# Patient Record
Sex: MALE | Race: WHITE | Employment: OTHER | URBAN - METROPOLITAN AREA
[De-identification: names, ages, dates, MRNs, and addresses within clinical notes are randomized per-mention and may not be internally consistent; named-entity substitution may affect disease eponyms.]

---

## 2017-02-08 ENCOUNTER — OFFICE VISIT (OUTPATIENT)
Dept: FAMILY MEDICINE | Facility: CLINIC | Age: 67
End: 2017-02-08
Payer: COMMERCIAL

## 2017-02-08 ENCOUNTER — RADIANT APPOINTMENT (OUTPATIENT)
Dept: GENERAL RADIOLOGY | Facility: CLINIC | Age: 67
End: 2017-02-08
Attending: FAMILY MEDICINE
Payer: COMMERCIAL

## 2017-02-08 ENCOUNTER — TELEPHONE (OUTPATIENT)
Dept: FAMILY MEDICINE | Facility: CLINIC | Age: 67
End: 2017-02-08

## 2017-02-08 VITALS
TEMPERATURE: 97.7 F | WEIGHT: 221 LBS | DIASTOLIC BLOOD PRESSURE: 87 MMHG | SYSTOLIC BLOOD PRESSURE: 170 MMHG | HEART RATE: 74 BPM | RESPIRATION RATE: 14 BRPM | OXYGEN SATURATION: 97 % | BODY MASS INDEX: 32.18 KG/M2

## 2017-02-08 DIAGNOSIS — E78.5 HYPERLIPIDEMIA LDL GOAL <100: ICD-10-CM

## 2017-02-08 DIAGNOSIS — I25.10 CORONARY ARTERY DISEASE INVOLVING NATIVE CORONARY ARTERY OF NATIVE HEART WITHOUT ANGINA PECTORIS: Primary | ICD-10-CM

## 2017-02-08 DIAGNOSIS — I10 HYPERTENSION GOAL BP (BLOOD PRESSURE) < 140/90: ICD-10-CM
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
## 2017-02-08 DIAGNOSIS — R07.81 RIB PAIN ON LEFT SIDE: ICD-10-CM

## 2017-02-08 DIAGNOSIS — J18.9 PNEUMONIA OF LEFT LOWER LOBE DUE TO INFECTIOUS ORGANISM: Primary | ICD-10-CM

## 2017-02-08 PROCEDURE — 71101 X-RAY EXAM UNILAT RIBS/CHEST: CPT | Mod: LT

## 2017-02-08 PROCEDURE — 99214 OFFICE O/P EST MOD 30 MIN: CPT | Performed by: FAMILY MEDICINE

## 2017-02-08 RX ORDER — LEVOFLOXACIN 500 MG/1
500 TABLET, FILM COATED ORAL DAILY
Qty: 7 TABLET | Refills: 0 | Status: SHIPPED | OUTPATIENT
Start: 2017-02-08 | End: 2017-02-22

## 2017-02-08 RX ORDER — HYDROCODONE BITARTRATE AND ACETAMINOPHEN 5; 325 MG/1; MG/1
1 TABLET ORAL EVERY 6 HOURS PRN
Qty: 30 TABLET | Refills: 0 | Status: SHIPPED | OUTPATIENT
Start: 2017-02-08 | End: 2017-02-09

## 2017-02-08 RX ORDER — SIMVASTATIN 20 MG
20 TABLET ORAL AT BEDTIME
Qty: 30 TABLET | Refills: 0 | Status: SHIPPED | OUTPATIENT
Start: 2017-02-08 | End: 2017-02-22

## 2017-02-08 RX ORDER — AMLODIPINE BESYLATE 2.5 MG/1
2.5 TABLET ORAL DAILY
Qty: 30 TABLET | Refills: 0 | Status: SHIPPED | OUTPATIENT
Start: 2017-02-08 | End: 2017-02-22

## 2017-02-08 RX ORDER — CYCLOBENZAPRINE HCL 5 MG
TABLET ORAL
Qty: 90 TABLET | Refills: 0 | Status: SHIPPED | OUTPATIENT
Start: 2017-02-08 | End: 2017-02-22

## 2017-02-08 ASSESSMENT — PAIN SCALES - GENERAL: PAINLEVEL: MODERATE PAIN (4)

## 2017-02-08 NOTE — PATIENT INSTRUCTIONS
Rib Contusion or Minor Fracture    A rib contusion is a bruise to one or more rib bones. It may cause pain, tenderness, swelling, and a purplish tint to the skin. There may be a sharp pain with each breath. A rib contusion takes anywhere from a few days to a few weeks to heal. A minor rib fracture or break may cause the same symptoms as a rib contusion. The small crack may not be seen on a regular chest X-ray. Treatment for both problems is the same.  Home care    You may use over-the-counter pain medicine to control pain, unless another pain medicine was prescribed. If you have chronic liver or kidney disease or ever had a stomach ulcer or GI bleeding, talk with your healthcare provider before using these medicines.    Rest. Do not lift anything heavy or do any activity that causes pain.    Apply an ice pack over the injured area for 15 to 20 minutes every 1 to 2 hours. You should do this for the first 24 to 48 hours. You can make an ice pack by filling a plastic bag that seals at the top with ice cubes and then wrapping it with a thin towel. Continue with ice packs as needed for the relief of pain and swelling.    The first 3 to 4 weeks of healing will be the most painful. If your pain is not under control with the treatment given, call your healthcare provider. Sometimes a stronger pain medicine may be needed. A nerve block can be done in case of severe pain. It will numb the nerve between the ribs.  Follow-up care  Follow up with your healthcare provider, or as advised.  If X-rays were taken, you will be told of any new findings that may affect your care.  Call 911   Call 911 if you have:    Dizziness, weakness or fainting    Shortness of breath with or without chest discomfort    New or worsening pain  When to seek medical advice  Call your healthcare provider right away if any of these occur:    Fever of 100.4 F (38 C) or above lasting for 24 to 48 hours    Stomach pain    2803-9396 [employer] [address] [place] [address]. All rights reserved. This information is not intended as a substitute for professional medical care. Always follow your healthcare professional's instructions.

## 2017-02-08 NOTE — MR AVS SNAPSHOT
After Visit Summary   2/8/2017    Jcarlos Alvarez    MRN: [id number]           Patient Information     Date Of Birth          1950        Visit Information        Provider Department      2/8/2017 1:20 PM Marta Hagen MD [facility]        Today's Diagnoses     Coronary artery disease involving native coronary artery of native heart without angina pectoris    -  1     Hyperlipidemia LDL goal <100         Hypertension goal BP (blood pressure) < 140/90         Rib pain on left side         Fall due to ice or snow, initial encounter           Care Instructions      Rib Contusion or Minor Fracture    A rib contusion is a bruise to one or more rib bones. It may cause pain, tenderness, swelling, and a purplish tint to the skin. There may be a sharp pain with each breath. A rib contusion takes anywhere from a few days to a few weeks to heal. A minor rib fracture or break may cause the same symptoms as a rib contusion. The small crack may not be seen on a regular chest X-ray. Treatment for both problems is the same.  Home care    You may use over-the-counter pain medicine to control pain, unless another pain medicine was prescribed. If you have chronic liver or kidney disease or ever had a stomach ulcer or GI bleeding, talk with your healthcare provider before using these medicines.    Rest. Do not lift anything heavy or do any activity that causes pain.    Apply an ice pack over the injured area for 15 to 20 minutes every 1 to 2 hours. You should do this for the first 24 to 48 hours. You can make an ice pack by filling a plastic bag that seals at the top with ice cubes and then wrapping it with a thin towel. Continue with ice packs as needed for the relief of pain and swelling.    The first 3 to 4 weeks of healing will be the most painful. If your pain is not under control with the treatment given, call your healthcare provider. Sometimes a stronger pain medicine may be needed.  A nerve block can be done in case of severe pain. It will numb the nerve between the ribs.  Follow-up care  Follow up with your healthcare provider, or as advised.  If X-rays were taken, you will be told of any new findings that may affect your care.  Call 911   Call 911 if you have:    Dizziness, weakness or fainting    Shortness of breath with or without chest discomfort    New or worsening pain  When to seek medical advice  Call your healthcare provider right away if any of these occur:    Fever of 100.4 F (38 C) or above lasting for 24 to 48 hours    Stomach pain    5814-4655 [employer]. [address] [place] [address] 71242. All rights reserved. This information is not intended as a substitute for professional medical care. Always follow your healthcare professional's instructions.              Follow-ups after your visit        Who to contact     If you have questions or need follow up information about today's clinic visit or your schedule please contact [facility] directly at [phone].  Normal or non-critical lab and imaging results will be communicated to you by [employer], letter or phone within 4 business days after the clinic has received the results. If you do not hear from us within 7 days, please contact the clinic through [employer] or phone. If you have a critical or abnormal lab result, we will notify you by phone as soon as possible.  Submit refill requests through [employer] or call your pharmacy and they will forward the refill request to us. Please allow 3 business days for your refill to be completed.          Additional Information About Your Visit        [employer] Information     [employer] gives you secure access to your electronic health record. If you see a primary care provider, you can also send messages to your care team and make appointments. If you have questions, please call your primary care clinic.  If you do not have a primary care provider, please call  [phone] and they will assist you.        Care EveryWhere ID     This is your Care EveryWhere ID. This could be used by other organizations to access your [facility] medical records  [id number]        Your Vitals Were     Pulse Temperature Respirations Pulse Oximetry          74 97.7  F (36.5  C) (Oral) 14 97%         Blood Pressure from Last 3 Encounters:   02/08/17 170/87   07/17/14 138/80   12/04/13 131/69    Weight from Last 3 Encounters:   02/08/17 221 lb (100.245 kg)   07/17/14 197 lb (89.359 kg)   12/04/13 210 lb (95.255 kg)              We Performed the Following     XR Ribs & Chest Lt 3v          Today's Medication Changes          These changes are accurate as of: 2/8/17  2:29 PM.  If you have any questions, ask your nurse or doctor.               Start taking these medicines.        Dose/Directions    amLODIPine 2.5 MG tablet   Commonly known as:  NORVASC   Used for:  Hypertension goal BP (blood pressure) < 140/90   Started by:  Marta Hagen MD        Dose:  2.5 mg   Take 1 tablet (2.5 mg) by mouth daily   Quantity:  30 tablet   Refills:  0       HYDROcodone-acetaminophen 5-325 MG per tablet   Commonly known as:  NORCO   Used for:  Rib pain on left side, Fall due to ice or snow, initial encounter   Started by:  Marta Hagen MD        Dose:  1 tablet   Take 1 tablet by mouth every 6 hours as needed for moderate to severe pain or pain   Quantity:  30 tablet   Refills:  0            Where to get your medicines      These medications were sent to [id number] - [facility] [address] [place] [address] AT [facility]  [address] [facility] [address]     Phone:  [phone]    - amLODIPine 2.5 MG tablet      Some of these will need a paper prescription and others can be bought over the counter.  Ask your nurse if you have questions.     Bring a paper prescription for each of these medications    - HYDROcodone-acetaminophen 5-325 MG per  tablet             Primary Care Provider Office Phone # Fax #    Feuntes Sims -[phone]       [facility] [address] [facility] [address]        Thank you!     Thank you for choosing [facility]  for your care. Our goal is always to provide you with excellent care. Hearing back from our patients is one way we can continue to improve our services. Please take a few minutes to complete the written survey that you may receive in the mail after your visit with us. Thank you!             Your Updated Medication List - Protect others around you: Learn how to safely use, store and throw away your medicines at www.disposemymeds.org.          This list is accurate as of: 2/8/17  2:29 PM.  Always use your most recent med list.                   Brand Name Dispense Instructions for use    amLODIPine 2.5 MG tablet    NORVASC    30 tablet    Take 1 tablet (2.5 mg) by mouth daily       aspirin 81 MG tablet      Take 1 tablet by mouth daily.       HYDROcodone-acetaminophen 5-325 MG per tablet    NORCO    30 tablet    Take 1 tablet by mouth every 6 hours as needed for moderate to severe pain or pain       lisinopril 10 MG tablet    PRINIVIL/ZESTRIL    90 tablet    Take 1 tablet (10 mg) by mouth daily       Multi-vitamin Tabs tablet   Generic drug:  multivitamin, therapeutic with minerals      1 TABLET DAILY       order for DME     1 Device    Please dispense one pair of gel inserts for heel       simvastatin 20 MG tablet    ZOCOR     Take  by mouth At Bedtime.

## 2017-02-08 NOTE — NURSING NOTE
"Chief Complaint   Patient presents with     Fall     pt fell 2/7/17 c/o left side and back pain       Initial /87 mmHg  Pulse 74  Temp(Src) 97.7  F (36.5  C) (Oral)  Resp 14  Wt 221 lb (100.245 kg)  SpO2 97% Estimated body mass index is 32.18 kg/(m^2) as calculated from the following:    Height as of 7/17/14: 5' 9.5\" (1.765 m).    Weight as of this encounter: 221 lb (100.245 kg).  Medication Reconciliation: complete   Daquan Kunz MA      "

## 2017-02-08 NOTE — TELEPHONE ENCOUNTER
Called jasson. Discussed xray report and mychart message over the phone (see mychart message)  Prescribed with levaquin to treat pneumonia seen on chest xray . Side effects discussed. Aware of the risks of increased tendon rupture with fluoroquinolones, especially if used with steroids.  No signs or symptoms of pneumonia, just left rib pain but could've been a complication of rib fracture or atelectasis.  Recommend repeat chest xray in 6 weeks to make sure resolved. Order in, patient will schedule appointment  If persist then, recommend ct chest  Patient voiced understanding. Come in asap if worsening symptoms or if no relief  If with any symptoms of chest pain or shortness of breath, lightheadedness, palpitations, feeling like passing out or change and worsening in the quality of your symptoms, please proceed to ER. Recommend follow up with PCP in a few days for re-evaluation.   Offered ct evaluation, patient declined. Prefers empiric treatment .   Marta Hagen M.D.

## 2017-02-08 NOTE — PROGRESS NOTES
SUBJECTIVE:                                                    Jcarlos Alvarez is a 66 year old male who presents to clinic today for the following health issues:      Musculoskeletal problem/pain      Duration: 2/7/17    Description  Location: left side and back    Intensity:  moderate    Accompanying signs and symptoms: none    History  Previous similar problem: no   Previous evaluation:  none    Precipitating or alleviating factors:  Trauma or overuse: YES  Aggravating factors include: overuse    Therapies tried and outcome: ice       Patient CAD native heart. Brother had a heart attack saw cardiology based on risk recommended simvastatin low dose  Patient used to be on lisinopril for hypertension but has run out past 2 months. Would like to try something else because of cough. Possibly from lisinopril but.= Patient not absolutely sure.    Yesterday had a fall slipped on the driveway landed on the left side on the left rib   head injury:No  loss of consciousness:  No  syncope or presyncope: No  chest pain or palpitation: No  mechanical fall:  Yes  using assistive devices:  No  blood thinners: No except for baby aspirin.   Pregnant: N/A    ROS:  as per hpi  denies headache  denies any nausea or vomiting  denies any amnesia, confusion or concussion symptoms  denies any blurring of vision  denies any otorrhea or rhinorhea  denies any neck pain  denies any back pain.  denies any chest pain or shortness of breath  denies any joint pain except noted above.  denies any bowel or bladder incontinence or motor or sensory deficits.  denies any abdominal pain, nausea or vomiting or flank pain  denies any hematuria      Allergies   Allergen Reactions     Nkda [No Known Drug Allergies]        Past Medical History   Diagnosis Date     Cervicalgia      Old bucket handle tear of medial meniscus      right     Other affections of shoulder region, not elsewhere classified      Diverticula of colon 2009     Diverticulosis sigmoid  colon.         Current Outpatient Prescriptions on File Prior to Visit:  simvastatin (ZOCOR) 20 MG tablet Take  by mouth At Bedtime.   ORDER FOR DME Please dispense one pair of gel inserts for heel   MULTI-VITAMIN OR TABS 1 TABLET DAILY   aspirin 81 MG tablet Take 1 tablet by mouth daily.   lisinopril (PRINIVIL,ZESTRIL) 10 MG tablet Take 1 tablet (10 mg) by mouth daily     No current facility-administered medications on file prior to visit.    Social History   Substance Use Topics     Smoking status: Never Smoker      Smokeless tobacco: Never Used     Alcohol Use: Yes      Comment: Occassionally       ROS:  10 point review of systems negative except for noted above.   No thoughts of harming self or others.     OBJECTIVE:  /87 mmHg  Pulse 74  Temp(Src) 97.7  F (36.5  C) (Oral)  Resp 14  Wt 221 lb (100.245 kg)  SpO2 97%   General:   awake, alert, and cooperative.  NAD.   Head: Normocephalic, atraumatic.  Eyes: Conjunctiva clear,   ENT: no periorbital ecchymosis, no otorrhea or rhinorrhea, negative Conner's sign, no raccoon eyes, no hematympanum  Heart: Regular rate and rhythm. No murmur.  Lungs: Chest is clear; no wheezes or rales.   Abdomen: soft non-tender. No bruising noted.   Neuro: Alert and oriented - normal speech. Cranial nerves intact, MMT 5/5 all extremities, sensory intact, normal gait and normal cerebellar function  MS: Using extremities freely positive left lateral rib pain reproducible with palpation and increased pain with range of motion , No cervical, thoracic, or lumbar spine tenderness  PSYCH:  Normal affect, normal speech  SKIN: no obvious rashes    Diagnostic Test Results:    Xray to my initial read negative but see telephone note encounter no rib fracture but possible pneumonia seen      ASSESSMENT      ICD-10-CM    1. Coronary artery disease involving native coronary artery of native heart without angina pectoris I25.10    2. Hyperlipidemia LDL goal <100 E78.5    3. Hypertension goal  BP (blood pressure) < 140/90 I10 amLODIPine (NORVASC) 2.5 MG tablet   4. Rib pain on left side R07.81 XR Ribs & Chest Lt 3v     HYDROcodone-acetaminophen (NORCO) 5-325 MG per tablet     cyclobenzaprine (FLEXERIL) 5 MG tablet   5. Fall due to ice or snow, initial encounter W00.9XXA HYDROcodone-acetaminophen (NORCO) 5-325 MG per tablet         PLAN:   Fall sustaining rib pain or injury. See patient instructions  Pain control : prescribed with vicodin as needed moderate to severe pain. Also prescribed with flexeril  Warned sedating try not to take together  Sedating medications given. Aware not to drive or operate machinery while on these medications. Caution with .     BP elevated but patient not taking anything requesting switch different bp med. Trial amlodipine. Side effects discussed  Follow up with primary care provider within one to two weeks  If with any symptoms of chest pain or shortness of breath, lightheadedness, palpitations, feeling like passing out or change and worsening in the quality of your symptoms, please proceed to ER. Recommend follow up with PCP in a few days for re-evaluation.     Refilled simvastatin as well for one month.     Aware to go to the ER if with worsening symptoms of headache, nausea or vomiting, chest pain or shortness of breath, bowel/bladder incontinence, motor or sensory deficits, bloody urine, changes in behavior, confusion, difficulty walking or seizure    ADDENDUM: telephone note  Called jasson. Discussed xray report and mychart message over the phone (see mychart message)  Prescribed with levaquin to treat pneumonia seen on chest xray . Side effects discussed. Aware of the risks of increased tendon rupture with fluoroquinolones, especially if used with steroids.  No signs or symptoms of pneumonia, just left rib pain but could've been a complication of rib fracture or atelectasis.  Recommend repeat chest xray in 6 weeks to make sure resolved. Order in, patient  will schedule appointment  If persist then, recommend ct chest  Patient voiced understanding. Come in asap if worsening symptoms or if no relief  If with any symptoms of chest pain or shortness of breath, lightheadedness, palpitations, feeling like passing out or change and worsening in the quality of your symptoms, please proceed to ER. Recommend follow up with PCP in a few days for re-evaluation.   Offered ct evaluation, patient declined. Prefers empiric treatment .    Marta Hagen M.D.     Follow up:  With primary care provider in 3 days, sooner if worse   Advised about symptoms which might herald more serious problems.        Marta Hagen MD

## 2017-02-08 NOTE — TELEPHONE ENCOUNTER
"Per patient, fell of curb yesterday and fell onto back, hurt ribs 6\" below scapula.  Denies shortness of breath, dyspnea on exertion, chest pain.   An appointment is schedule for today due to pain.   The patient/parent agrees with the plan and verbalized good understanding.  Angelica Maurer RN     "

## 2017-02-09 ENCOUNTER — MYC MEDICAL ADVICE (OUTPATIENT)
Dept: FAMILY MEDICINE | Facility: CLINIC | Age: 67
End: 2017-02-09

## 2017-02-09 DIAGNOSIS — R07.81 RIB PAIN ON LEFT SIDE: Primary | ICD-10-CM
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
## 2017-02-09 RX ORDER — HYDROCODONE BITARTRATE AND ACETAMINOPHEN 5; 325 MG/1; MG/1
2 TABLET ORAL EVERY 6 HOURS PRN
Qty: 40 TABLET | Refills: 0 | Status: SHIPPED | OUTPATIENT
Start: 2017-02-10 | End: 2017-02-15

## 2017-02-09 NOTE — TELEPHONE ENCOUNTER
[employer] message sent to patient.   See message for details.    Provider put Rx in outbox to process.    Lacy Francois RN

## 2017-02-09 NOTE — TELEPHONE ENCOUNTER
Ok to take 2 tablets at a time every 6 hours as needed for pain.   Recommend taking stool softeners (miralax) to avoid constipation with this dose.   May need additional meds. I printed a prescription for him for .  When the new prescription runs out, will need re-evaluation.  Consider alternating vicodin with ibuprofen to afford better relief in between vicodin doses and to cut down on narcotic use.    If worsening symptoms or no relief come in to be seen.    Thanks  Marta Hagen M.D.

## 2017-02-09 NOTE — TELEPHONE ENCOUNTER
Routing to Dr Hagen to advise on patient's pain and medications prescribed last night.    Lacy Francois RN

## 2017-02-13 ENCOUNTER — RADIANT APPOINTMENT (OUTPATIENT)
Dept: GENERAL RADIOLOGY | Facility: CLINIC | Age: 67
End: 2017-02-13
Attending: FAMILY MEDICINE
Payer: COMMERCIAL

## 2017-02-13 ENCOUNTER — OFFICE VISIT (OUTPATIENT)
Dept: FAMILY MEDICINE | Facility: CLINIC | Age: 67
End: 2017-02-13
Payer: COMMERCIAL

## 2017-02-13 VITALS
WEIGHT: 224 LBS | BODY MASS INDEX: 32.6 KG/M2 | DIASTOLIC BLOOD PRESSURE: 100 MMHG | HEART RATE: 83 BPM | OXYGEN SATURATION: 92 % | SYSTOLIC BLOOD PRESSURE: 160 MMHG | TEMPERATURE: 98.1 F

## 2017-02-13 DIAGNOSIS — J18.9 PNEUMONIA OF LEFT LOWER LOBE DUE TO INFECTIOUS ORGANISM: ICD-10-CM

## 2017-02-13 DIAGNOSIS — R07.81 RIB PAIN ON LEFT SIDE: ICD-10-CM

## 2017-02-13 DIAGNOSIS — R07.81 RIB PAIN ON LEFT SIDE: Primary | ICD-10-CM

## 2017-02-13 PROCEDURE — 71101 X-RAY EXAM UNILAT RIBS/CHEST: CPT | Mod: LT

## 2017-02-13 PROCEDURE — 99214 OFFICE O/P EST MOD 30 MIN: CPT | Performed by: FAMILY MEDICINE

## 2017-02-13 RX ORDER — MORPHINE SULFATE 15 MG/1
15 TABLET, FILM COATED, EXTENDED RELEASE ORAL EVERY 12 HOURS
Qty: 14 TABLET | Refills: 0 | Status: SHIPPED | OUTPATIENT
Start: 2017-02-13 | End: 2017-02-22

## 2017-02-13 NOTE — MR AVS SNAPSHOT
After Visit Summary   2/13/2017    Jcarlos Alvarez    MRN: [id number]           Patient Information     Date Of Birth          1950        Visit Information        Provider Department      2/13/2017 9:55 AM Mic Richardson MD [facility]        Today's Diagnoses     Rib pain on left side    -  1    Pneumonia of left lower lobe due to infectious organism           Follow-ups after your visit        Future tests that were ordered for you today     Open Future Orders        Priority Expected Expires Ordered    XR Ribs & Chest Left G/E 3 Views Routine 2/13/2017 2/13/2018 2/13/2017            Who to contact     If you have questions or need follow up information about today's clinic visit or your schedule please contact [facility] directly at [phone].  Normal or non-critical lab and imaging results will be communicated to you by MyChart, letter or phone within 4 business days after the clinic has received the results. If you do not hear from us within 7 days, please contact the clinic through Sovahart or phone. If you have a critical or abnormal lab result, we will notify you by phone as soon as possible.  Submit refill requests through LeTV or call your pharmacy and they will forward the refill request to us. Please allow 3 business days for your refill to be completed.          Additional Information About Your Visit        MyChart Information     LeTV gives you secure access to your electronic health record. If you see a primary care provider, you can also send messages to your care team and make appointments. If you have questions, please call your primary care clinic.  If you do not have a primary care provider, please call [phone] and they will assist you.        Care EveryWhere ID     This is your Care EveryWhere ID. This could be used by other organizations to access your [facility] medical records  [id number]        Your Vitals Were     Pulse  Temperature Pulse Oximetry BMI (Body Mass Index)          83 98.1  F (36.7  C) (Oral) 92% 32.6 kg/m2         Blood Pressure from Last 3 Encounters:   02/13/17 (!) 160/100   02/08/17 170/87   07/17/14 138/80    Weight from Last 3 Encounters:   02/13/17 224 lb (101.6 kg)   02/08/17 221 lb (100.2 kg)   07/17/14 197 lb (89.4 kg)                 Today's Medication Changes          These changes are accurate as of: 2/13/17 10:25 AM.  If you have any questions, ask your nurse or doctor.               Start taking these medicines.        Dose/Directions    morphine 15 MG 12 hr tablet   Commonly known as:  MS CONTIN   Used for:  Rib pain on left side   Started by:  Mic Richardson MD        Dose:  15 mg   Take 1 tablet (15 mg) by mouth every 12 hours Replaces vicodin for long acting pain   Quantity:  14 tablet   Refills:  0       nabumetone 750 MG tablet   Commonly known as:  RELAFEN   Used for:  Rib pain on left side   Started by:  Mic Richardson MD        Dose:  750 mg   Take 1 tablet (750 mg) by mouth 2 times daily as needed for moderate pain   Quantity:  30 tablet   Refills:  1       tiZANidine 4 MG tablet   Commonly known as:  ZANAFLEX   Used for:  Rib pain on left side   Started by:  Mic Richardson MD        Dose:  4 mg   Take 1 tablet (4 mg) by mouth 3 times daily as needed for muscle spasms Replaces flexeril   Quantity:  30 tablet   Refills:  1            Where to get your medicines      These medications were sent to [facility] [address] [place] [address] [address] [place] [address] AT [facility] [place] [address] [place]  [address] [facility] [address]     Phone:  [phone]     nabumetone 750 MG tablet    tiZANidine 4 MG tablet         Some of these will need a paper prescription and others can be bought over the counter.  Ask your nurse if you have questions.     Bring a paper prescription for each of these medications     morphine 15 MG 12 hr tablet                Primary Care Provider  Office Phone # Fax #    Fuentes Sims [phone]       [facility] [address] [address] [facility] [address]        Thank you!     Thank you for choosing [facility]  for your care. Our goal is always to provide you with excellent care. Hearing back from our patients is one way we can continue to improve our services. Please take a few minutes to complete the written survey that you may receive in the mail after your visit with us. Thank you!             Your Updated Medication List - Protect others around you: Learn how to safely use, store and throw away your medicines at www.disposemymeds.org.          This list is accurate as of: 2/13/17 10:25 AM.  Always use your most recent med list.                   Brand Name Dispense Instructions for use    amLODIPine 2.5 MG tablet    NORVASC    30 tablet    Take 1 tablet (2.5 mg) by mouth daily       aspirin 81 MG tablet      Take 1 tablet by mouth daily.       cyclobenzaprine 5 MG tablet    FLEXERIL    90 tablet    May take 1 or 2 tablets 3 times a day as needed for muscle spasm and pain. Sedating. Preferably take at bedtime       HYDROcodone-acetaminophen 5-325 MG per tablet    NORCO    40 tablet    Take 2 tablets by mouth every 6 hours as needed for moderate to severe pain or pain       levofloxacin 500 MG tablet    LEVAQUIN    7 tablet    Take 1 tablet (500 mg) by mouth daily       lisinopril 10 MG tablet    PRINIVIL/ZESTRIL    90 tablet    Take 1 tablet (10 mg) by mouth daily       morphine 15 MG 12 hr tablet    MS CONTIN    14 tablet    Take 1 tablet (15 mg) by mouth every 12 hours Replaces vicodin for long acting pain       Multi-vitamin Tabs tablet   Generic drug:  multivitamin, therapeutic with minerals      1 TABLET DAILY       nabumetone 750 MG tablet    RELAFEN    30 tablet    Take 1 tablet (750 mg) by mouth 2 times daily as needed for moderate pain       simvastatin 20 MG tablet    ZOCOR    30 tablet    Take 1 tablet  (20 mg) by mouth At Bedtime       tiZANidine 4 MG tablet    ZANAFLEX    30 tablet    Take 1 tablet (4 mg) by mouth 3 times daily as needed for muscle spasms Replaces flexeril

## 2017-02-13 NOTE — PROGRESS NOTES
SUBJECTIVE:  Jcarlos Alvarez, a 66 year old male scheduled an appointment to discuss the following issues:  Fall 6 days ago on curb/ left side rib pain/ follow up urgent care/ he did have pneumonia on x-ray left base.  Patient given vicodin/flexeril and levaquin.   No cough. No fevers or chills. Had cold one month ago. Non-smoker.   No history pneumonia or bronchitis. No shortness of breath. No black/bloody stools. No urine changes. No history ulcers. No kidney stones. No hematuria.   Ibuprofen. Ice. Working - . Painful to rib belt.   Not improving. Poor sleep from pain.   vicodin not very helpful but too short acting  ALCOHOL socially - not daily.   No LIGHTHEADED. No nausea, vomiting or diarrhea. Emotionally ok.   Painful to find sleep position and coughing/twisting movements painful.   Medical, social, surgical, and family histories reviewed.      OBJECTIVE:  BP (!) 160/100  Pulse 83  Temp 98.1  F (36.7  C) (Oral)  Wt 224 lb (101.6 kg)  SpO2 92%  BMI 32.6 kg/m2  EXAM:  GENERAL APPEARANCE: healthy, alert and no distress  EYES: EOMI,  PERRL  HENT: ear canals and TM's normal and nose and mouth without ulcers or lesions  NECK: no adenopathy, no asymmetry, masses, or scars and thyroid normal to palpation  RESP: lungs clear to auscultation - no rales, rhonchi or wheezes  CV: regular rates and rhythm, normal S1 S2, no S3 or S4 and no murmur, click or rub -  ABDOMEN:  soft, nontender, no HSM or masses and bowel sounds normal  MS: extremities normal- no gross deformities noted, no evidence of inflammation in joints, FROM in all extremities.  MS: tenderness left lower anterior/lateral rib area.   SKIN: no suspicious lesions or rashes  NEURO: Normal strength and tone, sensory exam grossly normal, mentation intact and speech normal  PSYCH: mentation appears normal and affect normal/bright    ASSESSMENT / PLAN:  (R07.81) Rib pain on left side  (primary encounter diagnosis)  Comment: likely contusion  Plan: XR Ribs  & Chest Left G/E 3 Views, tiZANidine         (ZANAFLEX) 4 MG tablet, morphine (MS CONTIN) 15        MG 12 hr tablet, nabumetone (RELAFEN) 750 MG         tablet        D/c vicodin. Change to long acting MS contin. Reveiwed risks and side effects of medication  D/c flexeril. Add zanaflex/relafen. Continue rib belt pain/ice. gentle RANGE OF MOTION. To ER if worsening pain/ black or bloody stools/hematuria or shortness of breath/ LIGHTHEADED.   AVOID ALCOHOL. Expected course and warning signs reviewed. Call/email with questions/concerns    (J18.9) Pneumonia of left lower lobe due to infectious organism  Comment: clinically stable and non-smoker. afebrile  Plan: XR Ribs & Chest Left G/E 3 Views        Continue levaquin. To ER if worsening shortness of breath/high fevers. Deep breathes several times/day.     Mic Richardson MD

## 2017-02-13 NOTE — NURSING NOTE
"Chief Complaint   Patient presents with     Fall       Initial BP (!) 160/100  Pulse 83  Temp 98.1  F (36.7  C) (Oral)  Wt 224 lb (101.6 kg)  SpO2 92%  BMI 32.6 kg/m2 Estimated body mass index is 32.6 kg/(m^2) as calculated from the following:    Height as of 7/17/14: 5' 9.5\" (1.765 m).    Weight as of this encounter: 224 lb (101.6 kg).  Medication Reconciliation: complete   Sharmin Baugh, CMA    "

## 2017-02-13 NOTE — PROGRESS NOTES
Fall 6 days ago on curb/ left side rib pain/ follow up urgent care/ he did have pneumonia on x-ray.

## 2017-02-14 ENCOUNTER — TELEPHONE (OUTPATIENT)
Dept: FAMILY MEDICINE | Facility: CLINIC | Age: 67
End: 2017-02-14

## 2017-02-14 ENCOUNTER — MYC MEDICAL ADVICE (OUTPATIENT)
Dept: FAMILY MEDICINE | Facility: CLINIC | Age: 67
End: 2017-02-14

## 2017-02-14 NOTE — TELEPHONE ENCOUNTER
Notes Recorded by Mic Richardson MD on 2/14/2017 at 8:10 AM  Please call patient. Still no fracture seen by radiologist on xray but they think that the pneumonia is slightly worse. I'm no sure about this because patient still not having much of a cough or fevers so I'm just going to have patient continue antibiotic given for now. I did place a referral for infectious disease for second opinion if patient not improving by end of week (please give #). Patient needs follow-up with his pmd at end of week for repeat xray and listen to lungs/etc. To ER if worsening pain/ shortness of breath or high fevers.     Patient:  Jcarlos Alvarez  [phone] (home) [phone] (work)    Provider:  Mic Richardson MD  Please call/evisit with questions or concerns.

## 2017-02-15 NOTE — TELEPHONE ENCOUNTER
Pt left vm on triage phone Tuesday at 3:41pm asking for a return call.  I attempted to call him back and left message for him to call me again or send [email or web address] message.  Sharmin Gooden RN

## 2017-02-15 NOTE — TELEPHONE ENCOUNTER
Pt returned call and left message on triage voice mail, attempted to call pt again and left message for pt to return my call.  Sharmin Gooden RN

## 2017-02-22 ENCOUNTER — OFFICE VISIT (OUTPATIENT)
Dept: FAMILY MEDICINE | Facility: CLINIC | Age: 67
End: 2017-02-22
Payer: COMMERCIAL

## 2017-02-22 VITALS
SYSTOLIC BLOOD PRESSURE: 154 MMHG | HEIGHT: 69 IN | TEMPERATURE: 97.7 F | HEART RATE: 73 BPM | OXYGEN SATURATION: 98 % | WEIGHT: 211 LBS | BODY MASS INDEX: 31.25 KG/M2 | DIASTOLIC BLOOD PRESSURE: 92 MMHG

## 2017-02-22 DIAGNOSIS — E78.5 DYSLIPIDEMIA: Primary | ICD-10-CM

## 2017-02-22 DIAGNOSIS — Z12.5 SCREENING FOR PROSTATE CANCER: ICD-10-CM

## 2017-02-22 DIAGNOSIS — Z23 NEED FOR VACCINATION: ICD-10-CM

## 2017-02-22 DIAGNOSIS — I10 HYPERTENSION GOAL BP (BLOOD PRESSURE) < 140/90: ICD-10-CM

## 2017-02-22 LAB
ALBUMIN SERPL-MCNC: 4.3 G/DL (ref 3.4–5)
ALP SERPL-CCNC: 112 U/L (ref 40–150)
ALT SERPL W P-5'-P-CCNC: 56 U/L (ref 0–70)
ANION GAP SERPL CALCULATED.3IONS-SCNC: 10 MMOL/L (ref 3–14)
AST SERPL W P-5'-P-CCNC: 27 U/L (ref 0–45)
BASOPHILS # BLD AUTO: 0 10E9/L (ref 0–0.2)
BASOPHILS NFR BLD AUTO: 0.3 %
BILIRUB SERPL-MCNC: 0.9 MG/DL (ref 0.2–1.3)
BUN SERPL-MCNC: 18 MG/DL (ref 7–30)
CALCIUM SERPL-MCNC: 9.3 MG/DL (ref 8.5–10.1)
CHLORIDE SERPL-SCNC: 99 MMOL/L (ref 94–109)
CHOLEST SERPL-MCNC: 117 MG/DL
CO2 SERPL-SCNC: 29 MMOL/L (ref 20–32)
CREAT SERPL-MCNC: 0.73 MG/DL (ref 0.66–1.25)
DIFFERENTIAL METHOD BLD: NORMAL
EOSINOPHIL # BLD AUTO: 0.2 10E9/L (ref 0–0.7)
EOSINOPHIL NFR BLD AUTO: 2.3 %
ERYTHROCYTE [DISTWIDTH] IN BLOOD BY AUTOMATED COUNT: 12.1 % (ref 10–15)
GFR SERPL CREATININE-BSD FRML MDRD: ABNORMAL ML/MIN/1.7M2
GLUCOSE SERPL-MCNC: 117 MG/DL (ref 70–99)
HCT VFR BLD AUTO: 46.3 % (ref 40–53)
HDLC SERPL-MCNC: 65 MG/DL
HGB BLD-MCNC: 16.5 G/DL (ref 13.3–17.7)
LDLC SERPL CALC-MCNC: 38 MG/DL
LYMPHOCYTES # BLD AUTO: 2.1 10E9/L (ref 0.8–5.3)
LYMPHOCYTES NFR BLD AUTO: 23.9 %
MCH RBC QN AUTO: 29.8 PG (ref 26.5–33)
MCHC RBC AUTO-ENTMCNC: 35.6 G/DL (ref 31.5–36.5)
MCV RBC AUTO: 84 FL (ref 78–100)
MONOCYTES # BLD AUTO: 0.6 10E9/L (ref 0–1.3)
MONOCYTES NFR BLD AUTO: 6.9 %
NEUTROPHILS # BLD AUTO: 5.9 10E9/L (ref 1.6–8.3)
NEUTROPHILS NFR BLD AUTO: 66.6 %
NONHDLC SERPL-MCNC: 52 MG/DL
PLATELET # BLD AUTO: 214 10E9/L (ref 150–450)
POTASSIUM SERPL-SCNC: 4 MMOL/L (ref 3.4–5.3)
PROT SERPL-MCNC: 7.9 G/DL (ref 6.8–8.8)
PSA SERPL-ACNC: 1.8 UG/L (ref 0–4)
RBC # BLD AUTO: 5.53 10E12/L (ref 4.4–5.9)
SODIUM SERPL-SCNC: 138 MMOL/L (ref 133–144)
TRIGL SERPL-MCNC: 68 MG/DL
WBC # BLD AUTO: 8.8 10E9/L (ref 4–11)

## 2017-02-22 PROCEDURE — 99214 OFFICE O/P EST MOD 30 MIN: CPT | Mod: 25 | Performed by: FAMILY MEDICINE

## 2017-02-22 PROCEDURE — 85025 COMPLETE CBC W/AUTO DIFF WBC: CPT | Performed by: FAMILY MEDICINE

## 2017-02-22 PROCEDURE — 80053 COMPREHEN METABOLIC PANEL: CPT | Performed by: FAMILY MEDICINE

## 2017-02-22 PROCEDURE — 36415 COLL VENOUS BLD VENIPUNCTURE: CPT | Performed by: FAMILY MEDICINE

## 2017-02-22 PROCEDURE — 90471 IMMUNIZATION ADMIN: CPT | Performed by: FAMILY MEDICINE

## 2017-02-22 PROCEDURE — 90670 PCV13 VACCINE IM: CPT | Performed by: FAMILY MEDICINE

## 2017-02-22 PROCEDURE — G0103 PSA SCREENING: HCPCS | Performed by: FAMILY MEDICINE

## 2017-02-22 PROCEDURE — 80061 LIPID PANEL: CPT | Performed by: FAMILY MEDICINE

## 2017-02-22 RX ORDER — SIMVASTATIN 40 MG
40 TABLET ORAL AT BEDTIME
Qty: 90 TABLET | Refills: 1 | Status: CANCELLED | OUTPATIENT
Start: 2017-02-22

## 2017-02-22 RX ORDER — ATORVASTATIN CALCIUM 80 MG/1
80 TABLET, FILM COATED ORAL DAILY
Qty: 90 TABLET | Refills: 3 | Status: SHIPPED | OUTPATIENT
Start: 2017-02-22 | End: 2017-09-05

## 2017-02-22 RX ORDER — LOSARTAN POTASSIUM AND HYDROCHLOROTHIAZIDE 12.5; 5 MG/1; MG/1
1 TABLET ORAL DAILY
Qty: 90 TABLET | Refills: 1 | Status: SHIPPED | OUTPATIENT
Start: 2017-02-22 | End: 2017-09-05

## 2017-02-22 NOTE — PROGRESS NOTES
"HPI:    Jcarlos is a 66 here to discuss:    Rib injury and abnormal CXR - he was seen by other providers and treated with pain meds and antibiotics for possible pneumonia. I suspect the lung infiltrate was blood. He is now back to normal. Denies fevers, shortness of breath, cough.    RIBS AND CHEST LEFT THREE VIEWS 2017 10:32 AM      HISTORY: Pleurodynia. Pneumonia, unspecified organism.     COMPARISON: 2017.         IMPRESSION: There is increasing infiltrate at the left lung base  compared with the previous examination. No evidence of pneumothorax.  The right lung is clear. No rib fractures are identified on the rib  detail views.     HERNÁN MONTANEZ MD    Personal and Family history of CAD - Brothers  age 49 and 64 due to MI - both were smokers. Denies chest pain, shortness of breath.   Treatment:   Follows with [facility] - CT angio showed \"diffuse disease\" in .    Zocor 20 mg qd - no side effects.   ASA 81 mg qd.     HTN - not checking at home. Not controlled in clinic.  Treatment;   Lisinopril 10 mg qd - stopped by patient for no specific reason   Norvasc 2.5 mg qd, just started recently.    BP Readings from Last 6 Encounters:   17 (!) 154/92   17 (!) 160/100   17 170/87   14 138/80   13 131/69   10/31/13 183/76     Last Basic Metabolic Panel:  Lab Results   Component Value Date     2014      Lab Results   Component Value Date    POTASSIUM 3.9 2014     Lab Results   Component Value Date    CHLORIDE 101 2014     Lab Results   Component Value Date    CEFERINO 8.7 2014     Lab Results   Component Value Date    CO2 29 2014     Lab Results   Component Value Date    BUN 18 2014     Lab Results   Component Value Date    CR 0.92 2014     Lab Results   Component Value Date    GLC 89 2014     Obesity - diet and exercise discussed. Declines nutrition referral.    Low back pain - resolved after visiting with physicians " "neck and back clinic.     Preventive -     Colonoscopy 12/31/09 with diverticulosis and otherwise normal. Repeat in 10 years.    ROS:     Const: No fevers or night sweats recently.   ENT: No runny nose, sore throat or ear pain.   Resp: No cough or shortness of breath.   CV: No chest pain, dizziness or cardiac palpitations.   GI: No nausea, vomiting, diarrhea or constipation. Denies blood in stools or black stools.   : No dysuria, frequency or hematuria.     SH:     . No kids. Works as . Exercise machine 3-4 miles at a time. Sometimes walks 5 miles. Etoh 2 per week. Non smoker. Caffeine 1 per day.     FH:    See HPI.    Exam:     BP (!) 154/92  Pulse 73  Temp 97.7  F (36.5  C) (Oral)  Ht 5' 9\" (1.753 m)  Wt 211 lb (95.7 kg)  SpO2 98%  BMI 31.16 kg/m2      Exam:    BP (!) 154/92  Pulse 73  Temp 97.7  F (36.5  C) (Oral)  Ht 5' 9\" (1.753 m)  Wt 211 lb (95.7 kg)  SpO2 98%  BMI 31.16 kg/m2    Gen: Healthy appearing male in no acute distress  Eyes: Conjunctiva and sclera normal. Pupils react normally to light. No nystagmus.  Neck: No enlarged lymph nodes, thyromegally or other masses.  Lungs: Good air movement and otherwise clear.  CV: Heart RRR with no murmurs. No JVD, carotid bruits or leg edema.  Assessment and Plan - Decision Making    1. Dyslipidemia  Per APT 4, high intensity statin recommended. I asked him to change the Zocor to Lipitor 80 mg qd.  - Lipid panel reflex to direct LDL  - atorvastatin (LIPITOR) 80 MG tablet; Take 1 tablet (80 mg) by mouth daily  Dispense: 90 tablet; Refill: 3    2. Hypertension goal BP (blood pressure) < 140/90  Not controlled. Stop Norvasc and take Hyzaar.  - losartan-hydrochlorothiazide (HYZAAR) 50-12.5 MG per tablet; Take 1 tablet by mouth daily  Dispense: 90 tablet; Refill: 1  - CBC with platelets differential  - Comprehensive metabolic panel    3. Screening for prostate cancer  Discussed controversy about screening.  - Prostate spec antigen screen    4. Need for " vaccination    - 1st  Administration  [41167]  - Pneumococcal vaccine 13 valent PCV13 IM (Prevnar) [44336]      Written instructions given as follows:    Patient Instructions   1. Let's change the Simvastatin to Lipitor 80 mg daily.    2. Let's change your blood pressure medication to Losartan/HCTZ 50/12.5 once per day.    3. Diet and exercise.    4. Stop by our pharmacy in 3 weeks without an appointment to check the blood pressure. If normal, let me see you back in 6 months for a physical. If high, the pharmacy will let me know.           Screening Questionnaire for Adult Immunization    Are you sick today?   No   Do you have allergies to medications, food, a vaccine component or latex?   No   Have you ever had a serious reaction after receiving a vaccination?   No   Do you have a long-term health problem with heart disease, lung disease, asthma, kidney disease, metabolic disease (e.g. diabetes), anemia, or other blood disorder?   Yes   Do you have cancer, leukemia, HIV/AIDS, or any other immune system problem?   No   In the past 3 months, have you taken medications that affect  your immune system, such as prednisone, other steroids, or anticancer drugs; drugs for the treatment of rheumatoid arthritis, Crohn s disease, or psoriasis; or have you had radiation treatments?   No   Have you had a seizure, or a brain or other nervous system problem?   No   During the past year, have you received a transfusion of blood or blood     products, or been given immune (gamma) globulin or antiviral drug?   No   For women: Are you pregnant or is there a chance you could become        pregnant during the next month?   No   Have you received any vaccinations in the past 4 weeks?   No     Immunization questionnaire was positive for at least one answer.  Notified Dr. Sims.      [facility] doesn't apply on this patient  Screening performed by Thao Chase on 2/22/2017 at 2:12 PM.

## 2017-02-22 NOTE — PATIENT INSTRUCTIONS
1. Let's change the Simvastatin to Lipitor 80 mg daily.    2. Let's change your blood pressure medication to Losartan/HCTZ 50/12.5 once per day.    3. Diet and exercise.    4. Stop by our pharmacy in 3 weeks without an appointment to check the blood pressure. If normal, let me see you back in 6 months for a physical. If high, the pharmacy will let me know.

## 2017-02-22 NOTE — MR AVS SNAPSHOT
After Visit Summary   2/22/2017    Jcarlos Alvarez    MRN: [id number]           Patient Information     Date Of Birth          1950        Visit Information        Provider Department      2/22/2017 1:30 PM Fuentes Sims MD [facility]        Today's Diagnoses     Dyslipidemia    -  1    Hypertension goal BP (blood pressure) < 140/90        Screening for prostate cancer          Care Instructions    1. Let's change the Simvastatin to Lipitor 80 mg daily.    2. Let's change your blood pressure medication to Losartan/HCTZ 50/12.5 once per day.    3. Diet and exercise.    4. Stop by our pharmacy in 3 weeks without an appointment to check the blood pressure. If normal, let me see you back in 6 months for a physical. If high, the pharmacy will let me know.          Follow-ups after your visit        Who to contact     If you have questions or need follow up information about today's clinic visit or your schedule please contact [facility] directly at [phone].  Normal or non-critical lab and imaging results will be communicated to you by uConnectt, letter or phone within 4 business days after the clinic has received the results. If you do not hear from us within 7 days, please contact the clinic through Mygeni or phone. If you have a critical or abnormal lab result, we will notify you by phone as soon as possible.  Submit refill requests through Mygeni or call your pharmacy and they will forward the refill request to us. Please allow 3 business days for your refill to be completed.          Additional Information About Your Visit        [employer] Information     Mygeni gives you secure access to your electronic health record. If you see a primary care provider, you can also send messages to your care team and make appointments. If you have questions, please call your primary care clinic.  If you do not have a primary care provider, please call [phone] and they will  "assist you.        Care EveryWhere ID     This is your Care EveryWhere ID. This could be used by other organizations to access your [facility] medical records  [id number]        Your Vitals Were     Pulse Temperature Height Pulse Oximetry BMI (Body Mass Index)       73 97.7  F (36.5  C) (Oral) 5' 9\" (1.753 m) 98% 31.16 kg/m2        Blood Pressure from Last 3 Encounters:   02/22/17 (!) 154/92   02/13/17 (!) 160/100   02/08/17 170/87    Weight from Last 3 Encounters:   02/22/17 211 lb (95.7 kg)   02/13/17 224 lb (101.6 kg)   02/08/17 221 lb (100.2 kg)              We Performed the Following     CBC with platelets differential     Comprehensive metabolic panel     Lipid panel reflex to direct LDL     Prostate spec antigen screen          Today's Medication Changes          These changes are accurate as of: 2/22/17  2:09 PM.  If you have any questions, ask your nurse or doctor.               Start taking these medicines.        Dose/Directions    atorvastatin 80 MG tablet   Commonly known as:  LIPITOR   Used for:  Dyslipidemia   Started by:  Fuentes Sims MD        Dose:  80 mg   Take 1 tablet (80 mg) by mouth daily   Quantity:  90 tablet   Refills:  3       losartan-hydrochlorothiazide 50-12.5 MG per tablet   Commonly known as:  HYZAAR   Used for:  Hypertension goal BP (blood pressure) < 140/90   Started by:  Fuentes Sims MD        Dose:  1 tablet   Take 1 tablet by mouth daily   Quantity:  90 tablet   Refills:  1         Stop taking these medicines if you haven't already. Please contact your care team if you have questions.     amLODIPine 2.5 MG tablet   Commonly known as:  NORVASC   Stopped by:  Fuentes Sims MD           simvastatin 20 MG tablet   Commonly known as:  ZOCOR   Stopped by:  Fuentes Sims MD                Where to get your medicines      These [facility] [address] [place] [address] [address] AT [address] [place] [address] [place]  [address] [facility] " [facility] [address]     Phone:  [phone]     atorvastatin 80 MG tablet    losartan-hydrochlorothiazide 50-12.5 MG per tablet                Primary Care Provider Office Phone # Fax #    Fuentes Sims [phone]       [facility] [address] [address] [facility] [address]        Thank you!     Thank you for choosing [facility]  for your care. Our goal is always to provide you with excellent care. Hearing back from our patients is one way we can continue to improve our services. Please take a few minutes to complete the written survey that you may receive in the mail after your visit with us. Thank you!             Your Updated Medication List - Protect others around you: Learn how to safely use, store and throw away your medicines at www.disposemymeds.org.          This list is accurate as of: 2/22/17  2:09 PM.  Always use your most recent med list.                   Brand Name Dispense Instructions for use    aspirin 81 MG tablet      Take 1 tablet by mouth daily.       atorvastatin 80 MG tablet    LIPITOR    90 tablet    Take 1 tablet (80 mg) by mouth daily       losartan-hydrochlorothiazide 50-12.5 MG per tablet    HYZAAR    90 tablet    Take 1 tablet by mouth daily       Multi-vitamin Tabs tablet   Generic drug:  multivitamin, therapeutic with minerals      1 TABLET DAILY

## 2017-02-22 NOTE — NURSING NOTE
"Chief Complaint   Patient presents with     Rib Problem     Cough     pneumonia recheck       Initial BP (!) 184/93 (Cuff Size: Adult Large)  Pulse 73  Temp 97.7  F (36.5  C) (Oral)  Ht 5' 9\" (1.753 m)  Wt 211 lb (95.7 kg)  SpO2 98%  BMI 31.16 kg/m2 Estimated body mass index is 31.16 kg/(m^2) as calculated from the following:    Height as of this encounter: 5' 9\" (1.753 m).    Weight as of this encounter: 211 lb (95.7 kg).  Medication Reconciliation: complete  Staff was masked during visit.   Thao Chase LPN    "

## 2017-02-24 NOTE — PROGRESS NOTES
Sabra Garber,    The glucose was in the pre-diabetes range. Weight loss via diet and exercise would be helpful.    All other labs were fine.    Please follow the instructions I gave you at our recent clinic visit.    Regards,    Fuentes Sims M.D.

## 2017-03-08 ENCOUNTER — TELEPHONE (OUTPATIENT)
Dept: FAMILY MEDICINE | Facility: CLINIC | Age: 67
End: 2017-03-08

## 2017-03-08 NOTE — TELEPHONE ENCOUNTER
Called pharmacy and left detailed voicemail since pharmacy is closed that patient is no longer taking simvastatin because it was changed to lipitor 80 mg.  This Rx was already sent to pharmacy 2/22/17.  Advised to call clinic back if questions.     Lacy Francois RN

## 2017-03-09 ENCOUNTER — TELEPHONE (OUTPATIENT)
Dept: FAMILY MEDICINE | Facility: CLINIC | Age: 67
End: 2017-03-09

## 2017-03-09 NOTE — TELEPHONE ENCOUNTER
Panel Management Review      Patient has the following on his problem list:     Hypertension   Last three blood pressure readings:  BP Readings from Last 3 Encounters:   02/22/17 (!) 154/92   02/13/17 (!) 160/100   02/08/17 170/87     Blood pressure: FAILED    HTN Guidelines:  Age 18-59 BP range:  Less than 140/90  Age 60-85 with Diabetes:  Less than 140/90  Age 60-85 without Diabetes:  less than 150/90      Composite cancer screening  Chart review shows that this patient is due/due soon for the following None  Summary:    Patient is due/failing the following:   BP CHECK    Action needed:   Patient needs office visit for Pharmacy for blood pressure check.    Type of outreach:    Phone, spoke to patient.  reminder to see the pharmacist for his blood pressure check    Questions for provider review:    I did talk to Dr. Levi Baugh, [facility]       Chart routed to 0 .

## 2017-03-27 ENCOUNTER — ALLIED HEALTH/NURSE VISIT (OUTPATIENT)
Dept: FAMILY MEDICINE | Facility: CLINIC | Age: 67
End: 2017-03-27
Payer: COMMERCIAL

## 2017-03-27 VITALS — DIASTOLIC BLOOD PRESSURE: 78 MMHG | SYSTOLIC BLOOD PRESSURE: 148 MMHG | HEART RATE: 76 BPM

## 2017-03-27 DIAGNOSIS — Z01.30 BP CHECK: Primary | ICD-10-CM

## 2017-03-27 PROCEDURE — 99207 ZZC NO CHARGE NURSE ONLY: CPT | Performed by: FAMILY MEDICINE

## 2017-03-27 NOTE — Clinical Note
Routing message to PCP for review -BP checked at pharmacy and noted to be above goal. Recommended patient follow-up with PCP.  Is the correct blood pressure goal entered based on age (150/90)?  Tahir Lovelace [facility]. [facility] [phone]

## 2017-03-27 NOTE — MR AVS SNAPSHOT
After Visit Summary   3/27/2017    Jcarlos Alvarez    MRN: [id number]           Patient Information     Date Of Birth          1950        Visit Information        Provider Department      3/27/2017 1:43 PM Fuentes Sims MD [facility]        Today's Diagnoses     BP check    -  1       Follow-ups after your visit        Who to contact     If you have questions or need follow up information about today's clinic visit or your schedule please contact [facility] directly at [phone].  Normal or non-critical lab and imaging results will be communicated to you by MyChart, letter or phone within 4 business days after the clinic has received the results. If you do not hear from us within 7 days, please contact the clinic through [employer] or phone. If you have a critical or abnormal lab result, we will notify you by phone as soon as possible.  Submit refill requests through [employer] or call your pharmacy and they will forward the refill request to us. Please allow 3 business days for your refill to be completed.          Additional Information About Your Visit        MyChart Information     [employer] gives you secure access to your electronic health record. If you see a primary care provider, you can also send messages to your care team and make appointments. If you have questions, please call your primary care clinic.  If you do not have a primary care provider, please call [phone] and they will assist you.        Care EveryWhere ID     This is your Care EveryWhere ID. This could be used by other organizations to access your [place] medical records  [id number]        Your Vitals Were     Pulse                   76            Blood Pressure from Last 3 Encounters:   03/27/17 148/78   02/22/17 (!) 154/92   02/13/17 (!) 160/100    Weight from Last 3 Encounters:   02/22/17 211 lb (95.7 kg)   02/13/17 224 lb (101.6 kg)   02/08/17 221 lb (100.2 kg)              Today, you  had the following     No orders found for display       Primary Care Provider Office Phone # Fax #    Fuentes Sims [phone]       [facility] [address] [facility] [address]        Thank you!     Thank you for choosing [facility]  for your care. Our goal is always to provide you with excellent care. Hearing back from our patients is one way we can continue to improve our services. Please take a few minutes to complete the written survey that you may receive in the mail after your visit with us. Thank you!             Your Updated Medication List - Protect others around you: Learn how to safely use, store and throw away your medicines at www.disposemymeds.org.          This list is accurate as of: 3/27/17  1:52 PM.  Always use your most recent med list.                   Brand Name Dispense Instructions for use    aspirin 81 MG tablet      Take 1 tablet by mouth daily.       atorvastatin 80 MG tablet    LIPITOR    90 tablet    Take 1 tablet (80 mg) by mouth daily       losartan-hydrochlorothiazide 50-12.5 MG per tablet    HYZAAR    90 tablet    Take 1 tablet by mouth daily       Multi-vitamin Tabs tablet   Generic drug:  multivitamin, therapeutic with minerals      1 TABLET DAILY

## 2017-03-29 ENCOUNTER — TELEPHONE (OUTPATIENT)
Dept: FAMILY MEDICINE | Facility: CLINIC | Age: 67
End: 2017-03-29

## 2017-03-29 DIAGNOSIS — I10 HYPERTENSION GOAL BP (BLOOD PRESSURE) < 140/90: Primary | ICD-10-CM

## 2017-03-29 RX ORDER — AMLODIPINE BESYLATE 5 MG/1
5 TABLET ORAL DAILY
Qty: 90 TABLET | Refills: 1 | Status: SHIPPED | OUTPATIENT
Start: 2017-03-29 | End: 2017-10-12

## 2017-03-30 NOTE — TELEPHONE ENCOUNTER
Please call patient:     1. BP is good but could be better.    2. Continue Losartan-hydrochlorothiazide 50-12.5 one per day.   3. Add Norvasc (Amlodipine) 5 mg daily. This is what you were taking at 2.5 mg in the past.   4. Make a lab only appointment to check the kidney function and potassium in 3 weeks.   5. Also in 3 weeks, stop by our pharmacy without an appointment to check the blood pressure. If normal, let me  see you back in 6 months for a physical. If high, the pharmacy will let me know.    Thanks.    Fuentes Sims M.D.    BP Readings from Last 6 Encounters:   03/27/17 148/78   02/22/17 (!) 154/92   02/13/17 (!) 160/100   02/08/17 170/87   07/17/14 138/80   12/04/13 131/69

## 2017-03-31 NOTE — TELEPHONE ENCOUNTER
Pt notified of provider message as written.  Pt verbalized good understanding.  Sharmin Gooden RN

## 2017-04-12 ENCOUNTER — RADIANT APPOINTMENT (OUTPATIENT)
Dept: GENERAL RADIOLOGY | Facility: CLINIC | Age: 67
End: 2017-04-12
Attending: FAMILY MEDICINE
Payer: COMMERCIAL

## 2017-04-12 ENCOUNTER — TELEPHONE (OUTPATIENT)
Dept: FAMILY MEDICINE | Facility: CLINIC | Age: 67
End: 2017-04-12

## 2017-04-12 ENCOUNTER — OFFICE VISIT (OUTPATIENT)
Dept: FAMILY MEDICINE | Facility: CLINIC | Age: 67
End: 2017-04-12
Payer: COMMERCIAL

## 2017-04-12 VITALS
WEIGHT: 214 LBS | RESPIRATION RATE: 15 BRPM | OXYGEN SATURATION: 97 % | BODY MASS INDEX: 31.6 KG/M2 | SYSTOLIC BLOOD PRESSURE: 140 MMHG | DIASTOLIC BLOOD PRESSURE: 70 MMHG | HEART RATE: 73 BPM | TEMPERATURE: 97.5 F

## 2017-04-12 DIAGNOSIS — I10 HYPERTENSION GOAL BP (BLOOD PRESSURE) < 140/90: ICD-10-CM

## 2017-04-12 DIAGNOSIS — R93.89 ABNORMAL X-RAY: ICD-10-CM

## 2017-04-12 DIAGNOSIS — M54.5 LOW BACK PAIN, UNSPECIFIED BACK PAIN LATERALITY, UNSPECIFIED CHRONICITY, WITH SCIATICA PRESENCE UNSPECIFIED: Primary | ICD-10-CM

## 2017-04-12 DIAGNOSIS — M25.512 ACUTE PAIN OF LEFT SHOULDER: Primary | ICD-10-CM

## 2017-04-12 DIAGNOSIS — M75.50 SUBSCAPULAR BURSITIS: ICD-10-CM

## 2017-04-12 PROCEDURE — 99214 OFFICE O/P EST MOD 30 MIN: CPT | Performed by: FAMILY MEDICINE

## 2017-04-12 PROCEDURE — 73030 X-RAY EXAM OF SHOULDER: CPT | Mod: LT

## 2017-04-12 PROCEDURE — 71020 XR CHEST 2 VW: CPT

## 2017-04-12 ASSESSMENT — PAIN SCALES - GENERAL: PAINLEVEL: MODERATE PAIN (4)

## 2017-04-12 NOTE — PROGRESS NOTES
SUBJECTIVE:                                                    Jcarlos Alvarez is a 66 year old male who presents to clinic today for the following health issues:      Musculoskeletal problem/pain      Duration: 1 week    Description  Location: left shoulder    Intensity:  mild    Accompanying signs and symptoms: none    History  Previous similar problem: no   Previous evaluation:  none    Precipitating or alleviating factors:  Trauma or overuse: patient sleeps on that side  Aggravating factors include: laying on it    Therapies tried and outcome: heat, ice and Ibuprofen; some relief      Left shoulder very sore. Can't remember anything that injured it.  Keeping awake at night.  There is one spot where it's really hurting.  No numbness or tingling  Occasionally has some neck stiffnes  No fevers or chills chest pain or shortness of breath   No problem lifting. Patient very active not exertional. Able to lift stuff over his head.     Patient last time seen in feb had rib pain. xrays were abnormal. Needs repeat to assure resolution.    BP not at goal but slightly improved per patient   No headache no blurring of vision no fevers or chills chest pain or shortness of breath       Problem list and histories reviewed & adjusted, as indicated.  Additional history: as documented    Problem list, Medication list, Allergies, and Medical/Social/Surgical histories reviewed in EPIC and updated as appropriate.    ROS:  Constitutional, HEENT, cardiovascular, pulmonary, gi and gu systems are negative, except as otherwise noted.    OBJECTIVE:                                                    /70  Pulse 73  Temp 97.5  F (36.4  C) (Oral)  Resp 15  Wt 214 lb (97.1 kg)  SpO2 97%  BMI 31.6 kg/m2  Body mass index is 31.6 kg/(m^2).  GENERAL: healthy, alert and no distress  EYES: Eyes grossly normal to inspection, PERRL and conjunctivae and sclerae normal  RESP: lungs clear to auscultation - no rales, rhonchi or wheezes  CV:  regular rate and rhythm, normal S1 S2, no S3 or S4, no murmur, click or rub, no peripheral edema and peripheral pulses strong  MS: extremities normal- no gross deformities noted  Musculoskeletal: Nocervical spine tenderness   mild left trapezius and levator muscle spasm on the left    Increased pain no pain with range of motion of the neck or shoulder  Tender pinpoint area on medial left scapula close to where it meets the upper rib  nontender AC or bicipital area. MMT 5/5 bilateral upper and lower extremity sensory intact.  No neurologic deficits. No sensory deficits or motor deficits both upper and lower extremity   SKIN: no suspicious lesions or rashes  NEURO: Normal strength and tone, mentation intact and speech normal  PSYCH: mentation appears normal, affect normal/bright    Diagnostic Test Results:  xrays of shoulder and chest negative to my review official pending     ASSESSMENT/PLAN:                                                        ICD-10-CM    1. Acute pain of left shoulder M25.512 XR Shoulder Left 2 Views   2. Abnormal x-ray R93.8 XR Chest 2 Views   3. Subscapular bursitis M75.50 [facility] PT, HAND, AND CHIROPRACTIC REFERRAL     ORTHOPEDICS ADULT REFERRAL   4. Hypertension goal BP (blood pressure) < 140/90 I10            1. Shoulder pain - possible bursitis  Referred to PT and follow up with ortho if symptoms persist  Adverse reactions of medications discussed.  Over the counter medications discussed.   Aware to come back in if with worsening symptoms or if no relief despite treatment plan  Patient voiced understanding and had no further questions.     2. Repeat chest xray normal to my review, official report pending.    3. BP not at goal. Follow up with primary care provider  Close to goal  Alarm signs or symptoms discussed, if present recommend go to ER         MD Marta Lazo MD  [facility]

## 2017-04-12 NOTE — TELEPHONE ENCOUNTER
Patient is calling to request referral for cortisone shot for lower back. Please call to advise. Thank you.

## 2017-04-12 NOTE — TELEPHONE ENCOUNTER
Patient never picked up Hydrocodone-Acetaminophen (Norco). Document destructed and placed in shred it bin.    Anitha Diallo,

## 2017-04-12 NOTE — PATIENT INSTRUCTIONS
Bursitis  You have bursitis. This is an inflammation of the bursa. These are small, fluid-filled sacs that surround the larger joints of the body. The bursa help the muscles and tendons move smoothly over the joints.  Bursitis often happens in the shoulder. But it can also affect the elbows, hips, pelvis, knees, toes, and heels. Bursitis can be caused by injury, overuse of the joint, or infection of the bursa. Symptoms include pain and tenderness over a joint. Symptoms get worse with movement.  Bursitis is treated with an anti-inflammatory medicine and by resting the joint. More severe cases require injection of medicine directly into the bursa.    Home care    Rest the painful joint and protect it from movement. This will allow the inflammation to heal faster.    Apply an ice pack over the injured area for no more than 15 to 20 minutes. Do this every 3 to 6 hours for the first 24 to 48 hours. Keep using ice packs 3 to 4 times a day until the pain and swelling improves.     To make an ice pack, put ice cubes in a sealed plastic zip-lock bag. Wrap the bag in a clean, thin towel or cloth. Never put ice or an ice pack directly on the skin. As the ice melts, be careful to avoid getting any wrap or splint wet.    You may take over-the-counter pain medicine to treat pain and inflammation, unless another medicine was prescribed. Anti-inflammatory pain medicines may be more effective. Talk with your provider beforeusing these medicines if you have chronic liver or kidney disease, or ever had a stomach ulcer or GI (gastrointestinal) bleeding.    As your symptoms improve, slowly begin to move the joint. Do not overuse the joint. This may cause the symptoms to flare up again.  When to seek medical advice  Call your healthcare provider right away if any of these occur:    Redness over the painful area    Increasing pain or swelling at the joint    Fever of 100.4 F (38 C) or above lasting for 24 to 48 hours    5208-5699 The  [employer]. [address] [place] [address], [place] [address]. All rights reserved. This information is not intended as a substitute for professional medical care. Always follow your healthcare professional's instructions.

## 2017-04-12 NOTE — LETTER
[facility]  [address] [facility] [id number] [phone]        April 13, 2017    Jcarlos Alvarez  [address]   [facility] [address]            Dear Jcarlos,    Your chest xray does show improvement of the abnormality seen in the left side of your lung. Complete or near-complete. This is reassuring. There is very mild elevation on the left side of your diaphragm as well which could all be related to this recent injury and infection that had happened. If you would like to absolutely be sure, we can order CT scan to evaluate further. However since your symptoms have improved and so have the xrays, we could also do a watch and wait approach with a follow up with your primary care provider.   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 I would recommend further evaluation if you are having a lot of back pain.     Take care. Marta Hagen M.D. /viridiana    Results for orders placed or performed in visit on 04/12/17   XR Chest 2 Views    Narrative    CHEST TWO VIEWS April 12, 2017 12:07 PM     HISTORY: Abnormal findings on diagnostic imaging of other specified  body structures.    COMPARISON: Chest x-rays dated 2/13/2017.    FINDINGS: Since the prior study the left basilar atelectasis versus  infiltrate have mostly cleared. Mild elevation left hemidiaphragm is  again noted. Lungs are otherwise grossly clear. Heart size and  pulmonary vascularity are within normal limits. No pneumothorax or  right pleural fluid collection. Tiny left pleural fluid collection is  difficult to exclude but is not definitely seen. Bridging  syndesmophytes are seen in the spine. This could represent an  ankylosing process.       Impression    IMPRESSION:   1. Mild elevation of the left hemidiaphragm is again noted.  2. Complete or near-complete resolution of left basilar atelectasis or  infiltrate.  3. Bridging syndesmophytes are seen in the spine. Recommend clinical  correlation for possible spondyloarthropathy.  4.  No other evidence of acute cardiopulmonary disease is seen.    ROGERS FAM MD   XR Shoulder Left 2 Views    Narrative    SHOULDER TWO VIEWS LEFT   4/12/2017 12:11 PM     HISTORY: Pain in left shoulder.    COMPARISON: None.     FINDINGS:  Moderate degenerative changes of the acromioclavicular  joint with moderate superior hypertrophic change and minimal inferior  hypertrophic change as well as mild joint space loss are noted.    No acute fracture or malalignment. Questionable small osteophyte is  seen at the inferior glenoid. Glenohumeral joint is otherwise  unremarkable. Coracoclavicular space is maintained. Visualized  portions of the adjacent lung are clear.      Impression    IMPRESSION:  1. Moderate degenerative changes of the AC joint with moderate-sized  superior hypertrophic change and minimal inferior hypertrophic change.  2. Questionable mild degenerative changes of the left shoulder.  3. No fracture or malalignment.    ROGERS FAM MD

## 2017-04-12 NOTE — MR AVS SNAPSHOT
After Visit Summary   4/12/2017    Jcarlos Alvarez    MRN: [id number]           Patient Information     Date Of Birth          1950        Visit Information        Provider Department      4/12/2017 11:40 AM Marta Hagen MD [facility]        Today's Diagnoses     Acute pain of left shoulder    -  1    Abnormal x-ray        Subscapular bursitis          Care Instructions      Bursitis  You have bursitis. This is an inflammation of the bursa. These are small, fluid-filled sacs that surround the larger joints of the body. The bursa help the muscles and tendons move smoothly over the joints.  Bursitis often happens in the shoulder. But it can also affect the elbows, hips, pelvis, knees, toes, and heels. Bursitis can be caused by injury, overuse of the joint, or infection of the bursa. Symptoms include pain and tenderness over a joint. Symptoms get worse with movement.  Bursitis is treated with an anti-inflammatory medicine and by resting the joint. More severe cases require injection of medicine directly into the bursa.    Home care    Rest the painful joint and protect it from movement. This will allow the inflammation to heal faster.    Apply an ice pack over the injured area for no more than 15 to 20 minutes. Do this every 3 to 6 hours for the first 24 to 48 hours. Keep using ice packs 3 to 4 times a day until the pain and swelling improves.     To make an ice pack, put ice cubes in a sealed plastic zip-lock bag. Wrap the bag in a clean, thin towel or cloth. Never put ice or an ice pack directly on the skin. As the ice melts, be careful to avoid getting any wrap or splint wet.    You may take over-the-counter pain medicine to treat pain and inflammation, unless another medicine was prescribed. Anti-inflammatory pain medicines may be more effective. Talk with your provider beforeusing these medicines if you have chronic liver or kidney disease, or ever had a stomach ulcer  or GI (gastrointestinal) bleeding.    As your symptoms improve, slowly begin to move the joint. Do not overuse the joint. This may cause the symptoms to flare up again.  When to seek medical advice  Call your healthcare provider right away if any of these occur:    Redness over the painful area    Increasing pain or swelling at the joint    Fever of 100.4 F (38 C) or above lasting for 24 to 48 hours    7704-5679 The [employer]. [address] [place] [address], [place] [address]. All rights reserved. This information is not intended as a substitute for professional medical care. Always follow your healthcare professional's instructions.              Follow-ups after your visit        Additional Services     [facility] PT, HAND, AND CHIROPRACTIC REFERRAL       **This order will print in the [facility] Scheduling Office**    Physical Therapy, Hand Therapy and Chiropractic Care are available through:    *[facility]  *[facility]  *[facility]    Call one number to schedule at any of the above locations: [phone].    Your provider has referred you to: Physical Therapy at [facility] or [facility]    Indication/Reason for Referral: Shoulder Pain  Onset of Illness: 1 week  Therapy Orders: Evaluate and Treat  Special Programs: None  Special Request: None    Carlos Gonzalez      Additional Comments for the Therapist or Chiropractor:         Please be aware that coverage of these services is subject to the terms and limitations of your health insurance plan.  Call member services at your health plan with any benefit or coverage questions.      Please bring the following to your appointment:    *Your personal calendar for scheduling future appointments  *Comfortable clothing            ORTHOPEDICS ADULT REFERRAL       Your provider has referred you to: [facility]: [facility] [phone]   [email or web address]    Please be aware that coverage of these  services is subject to the terms and limitations of your health insurance plan.  Call member services at your health plan with any benefit or coverage questions.      Please bring the following to your appointment:    >>   Any x-rays, CTs or MRIs which have been performed.  Contact the facility where they were done to arrange for  prior to your scheduled appointment.    >>   List of current medications   >>   This referral request   >>   Any documents/labs given to you for this referral                  Your next 10 appointments already scheduled     Apr 21, 2017 10:30 AM CDT   Office Visit with Fuentes Sims MD   [facility] ([facility])    [address] [facility] [address] [phone]           Bring a current list of meds and any records pertaining to this visit.  For Physicals, please bring immunization records and any forms needing to be filled out.  Please arrive 10 minutes early to complete paperwork.              Who to contact     If you have questions or need follow up information about today's clinic visit or your schedule please contact [facility] directly at [phone].  Normal or non-critical lab and imaging results will be communicated to you by [employer], letter or phone within 4 business days after the clinic has received the results. If you do not hear from us within 7 days, please contact the clinic through [employer] or phone. If you have a critical or abnormal lab result, we will notify you by phone as soon as possible.  Submit refill requests through [employer] or call your pharmacy and they will forward the refill request to us. Please allow 3 business days for your refill to be completed.          Additional Information About Your Visit        [employer] Information     [employer] gives you secure access to your electronic health record. If you see a primary care provider, you can also send messages to your care team and make appointments. If  you have questions, please call your primary care clinic.  If you do not have a primary care provider, please call [phone] and they will assist you.        Care EveryWhere ID     This is your Care EveryWhere ID. This could be used by other organizations to access your [place] medical records  [id number]        Your Vitals Were     Pulse Temperature Respirations Pulse Oximetry BMI (Body Mass Index)       73 97.5  F (36.4  C) (Oral) 15 97% 31.6 kg/m2        Blood Pressure from Last 3 Encounters:   04/12/17 146/80   03/27/17 148/78   02/22/17 (!) 154/92    Weight from Last 3 Encounters:   04/12/17 214 lb (97.1 kg)   02/22/17 211 lb (95.7 kg)   02/13/17 224 lb (101.6 kg)              We Performed the Following     [facility] PT, HAND, AND CHIROPRACTIC REFERRAL     ORTHOPEDICS ADULT REFERRAL     XR Chest 2 Views     XR Shoulder Left 2 Views        Primary Care Provider Office Phone # Fax #    Fuentes Sims [phone]       [facility] [address] [facility] [address]        Thank you!     Thank you for choosing [facility]  for your care. Our goal is always to provide you with excellent care. Hearing back from our patients is one way we can continue to improve our services. Please take a few minutes to complete the written survey that you may receive in the mail after your visit with us. Thank you!             Your Updated Medication List - Protect others around you: Learn how to safely use, store and throw away your medicines at www.disposemymeds.org.          This list is accurate as of: 4/12/17 12:23 PM.  Always use your most recent med list.                   Brand Name Dispense Instructions for use    amLODIPine 5 MG tablet    NORVASC    90 tablet    Take 1 tablet (5 mg) by mouth daily       aspirin 81 MG tablet      Take 1 tablet by mouth daily.       atorvastatin 80 MG tablet    LIPITOR    90 tablet    Take 1 tablet (80 mg) by mouth daily        losartan-hydrochlorothiazide 50-12.5 MG per tablet    HYZAAR    90 tablet    Take 1 tablet by mouth daily       Multi-vitamin Tabs tablet   Generic drug:  multivitamin, therapeutic with minerals      1 TABLET DAILY

## 2017-04-13 ENCOUNTER — THERAPY VISIT (OUTPATIENT)
Dept: PHYSICAL THERAPY | Facility: CLINIC | Age: 67
End: 2017-04-13
Payer: MEDICARE

## 2017-04-13 DIAGNOSIS — M25.512 CHRONIC LEFT SHOULDER PAIN: Primary | ICD-10-CM

## 2017-04-13 DIAGNOSIS — G89.29 CHRONIC LEFT SHOULDER PAIN: Primary | ICD-10-CM

## 2017-04-13 PROBLEM — M54.5 LOW BACK PAIN, UNSPECIFIED BACK PAIN LATERALITY, UNSPECIFIED CHRONICITY, WITH SCIATICA PRESENCE UNSPECIFIED: Status: ACTIVE | Noted: 2017-04-13

## 2017-04-13 PROCEDURE — G8981 BODY POS CURRENT STATUS: HCPCS | Mod: GP | Performed by: PHYSICAL THERAPIST

## 2017-04-13 PROCEDURE — G8982 BODY POS GOAL STATUS: HCPCS | Mod: GP | Performed by: PHYSICAL THERAPIST

## 2017-04-13 PROCEDURE — 97161 PT EVAL LOW COMPLEX 20 MIN: CPT | Mod: GP | Performed by: PHYSICAL THERAPIST

## 2017-04-13 NOTE — PROGRESS NOTES
[facility] Initial Evaluation    Subjective:    Patient is a 66 year old male presenting with rehab left shoulder hpi.   Additional Answers from Therapist interview  Jcarlos Alvarez is a 66 year old male with a left shoulder condition.  Condition occurred with:  Unknown cause.  This is a new condition   Insidious onset ~1 week ago. Date of MD order for PT 4/12/17.  Patient reports pain:  Posterior (radiates around to the lateral shoulder).  Radiates to:  Upper arm.  Associated with: occasional neck stiffness, patient doesn't feel this is related.  Symptoms are exacerbated by lying on extremity  and relieved by nothing and heat.  Current occupation is ; working full schedule no restrictions.    Pain is constant. Pain is 4/10 currently, occasionally up to 5/10. Pain is worse in the PM after a long day of work.  PMH is significant for HTN, otherwise healthy. General health rated by the patient is good. Takes HTN medication. No other medication, surgeries, allergies reported.     Patient's goal is to relieve the pain. Functionally, he would like to be able to lay on his right side and reach overhead for work without pain at the end of the day.                    Objective:    System              Cervical/Thoracic Evaluation    AROM:    AROM Thoracic:    Flexion:               WNL  Extension:          Major loss mid and upper thoracic  Rotation:            Left: WNL     Right: min loss                               Shoulder Evaluation:  ROM:  AROM:    Flexion:  Left:  135    Right:  140    Abduction:  Left: 140   Right:  140      External Rotation:  Left:  55    Right:  55            Extension/Internal Rotation:  Left:  T10    Right:  T12    PROM:  not assessed  Flexion:  Left:  155 with altered end feel    Right: 155      Abduction:  Left:  170    Right:  170      External Rotation:  Left:  60    Right:  60                    Strength:  normal                      Stability Testing:  not  assessed      Special Tests:      Left shoulder negative for the following special tests:  Labral; Impingement and Rotator cuff tear    Right shoulder negative for the following special tests:Labral; Impingement and Rotator cuff tear  Palpation:    Left shoulder tenderness present at:  Infraspinatus; Levator; Rhomboids and Upper Trap    Right shoulder tenderness not present at:Infraspinatus; Levator; Rhomboids or Upper Trap                                     General     ROS    Assessment/Plan:      Patient is a 66 year old male with left side shoulder complaints.    Patient has the following significant findings with corresponding treatment plan.                Diagnosis 1:  Posterior shoulder pain with underlying thoracic and rib hypomobility    Pain -  hot/cold therapy, manual therapy, education and home program  Decreased ROM/flexibility - manual therapy, therapeutic exercise and home program  Decreased joint mobility - manual therapy, therapeutic exercise and home program  Decreased strength - therapeutic exercise, therapeutic activities and home program  Impaired muscle performance - neuro re-education and home program  Impaired posture - neuro re-education and home program    Therapy Evaluation Codes:   1) History comprised of:   Personal factors that impact the plan of care:      Past/current experiences and None.    Comorbidity factors that impact the plan of care are:      None.     Medications impacting care: None.  2) Examination of Body Systems comprised of:   Body structures and functions that impact the plan of care:      Shoulder.   Activity limitations that impact the plan of care are:      reaching.  3) Clinical presentation characteristics are:   Stable/Uncomplicated.  4) Decision-Making    Low complexity using standardized patient assessment instrument and/or measureable assessment of functional outcome.  Cumulative Therapy Evaluation is: Low complexity.    Previous and current functional  limitations:  (See Goal Flow Sheet for this information)    Short term and Long term goals: (See Goal Flow Sheet for this information)     Communication ability:  Patient appears to be able to clearly communicate and understand verbal and written communication and follow directions correctly.  Treatment Explanation - The following has been discussed with the patient:   RX ordered/plan of care  Anticipated outcomes  Possible risks and side effects  This patient would benefit from PT intervention to resume normal activities.   Rehab potential is excellent.    Frequency:  1 X week, once daily  Duration:  for 4-6 weeks  Discharge Plan:  Achieve all LTG.  Independent in home treatment program.  Reach maximal therapeutic benefit.    Please refer to the daily flowsheet for treatment today, total treatment time and time spent performing 1:1 timed codes.

## 2017-04-13 NOTE — TELEPHONE ENCOUNTER
First you have to visit with the spine specialist.    They can decide if injection is the right treatment.    Please call [phone] to set up the appointment.    Fuentes Sims M.D.

## 2017-04-13 NOTE — LETTER
DEPARTMENT OF HEALTH AND HUMAN SERVICES  CENTERS FOR MEDICARE & MEDICAID SERVICES    PLAN/UPDATED PLAN OF PROGRESS FOR OUTPATIENT REHABILITATION    PATIENTS NAME:  Jcarlos Alvarez   : 1950  PROVIDER NUMBER:  [id number]  [id number]   PROVIDER NAME: [facility]  MEDICAL RECORD NUMBER: [id number]   START OF CARE DATE:  SOC Date: 17   TYPE:  PT  PRIMARY/TREATMENT DIAGNOSIS: (Pertinent Medical Diagnosis)  Chronic left shoulder pain  VISITS FROM START OF CARE:  Rxs Used: 1   [facility] Initial Evaluation    Subjective:  Patient is a 66 year old male presenting with rehab left shoulder hpi.   Additional Answers from Therapist interview  Jcarlos Alvarez is a 66 year old male with a left shoulder condition.  Condition occurred with:  Unknown cause.  This is a new condition.  Insidious onset ~1 week ago. Date of MD order for PT 17.  Patient reports pain:  Posterior (radiates around to the lateral shoulder).  Radiates to:  Upper arm. Associated with: occasional neck stiffness, patient doesn't feel this is related. Symptoms are exacerbated by lying on extremity  and relieved by nothing and heat.  Current occupation is ; working full schedule no restrictions.  Pain is constant. Pain is 4/10 currently, occasionally up to 5/10. Pain is worse in the PM after a long day of work. PMH is significant for HTN, otherwise healthy. General health rated by the patient is good. Takes HTN medication. No other medication, surgeries, allergies reported. Patient's goal is to relieve the pain. Functionally, he would like to be able to lay on his right side and reach overhead for work without pain at the end of the day.               Objective:         Cervical/Thoracic Evaluation  AROM:  AROM Thoracic:  Flexion:               WNL  Extension:          Major loss mid and upper thoracic  Rotation:            Left: WNL     Right: min loss         Shoulder  Evaluation:  ROM:  AROM:    Flexion:  Left:  135    Right:  140  Abduction:  Left: 140   Right:  140  External Rotation:  Left:  55    Right:  55  Extension/Internal Rotation:  Left:  T10    Right:  T12    PATIENTS NAME:  Jcarlos Alvarez   : 1950  PROM:  not assessed  Flexion:  Left:  155 with altered end feel    Right: 155    Abduction:  Left:  170    Right:  170  External Rotation:  Left:  60    Right:  60  Strength:  normal  Stability Testing:  not assessed  Special Tests:    Left shoulder negative for the following special tests:  Labral; Impingement and Rotator cuff tear  Right shoulder negative for the following special tests:Labral; Impingement and Rotator cuff tear  Palpation:    Left shoulder tenderness present at:  Infraspinatus; Levator; Rhomboids and Upper Trap  Right shoulder tenderness not present at:Infraspinatus; Levator; Rhomboids or Upper Trap      Assessment/Plan:    Patient is a 66 year old male with left side shoulder complaints.    Patient has the following significant findings with corresponding treatment plan.                Diagnosis 1:  Posterior shoulder pain with underlying thoracic and rib hypomobility    Pain -  hot/cold therapy, manual therapy, education and home program  Decreased ROM/flexibility - manual therapy, therapeutic exercise and home program  Decreased joint mobility - manual therapy, therapeutic exercise and home program  Decreased strength - therapeutic exercise, therapeutic activities and home program  Impaired muscle performance - neuro re-education and home program  Impaired posture - neuro re-education and home program    Therapy Evaluation Codes:   1) History comprised of:   Personal factors that impact the plan of care:      Past/current experiences and None.    Comorbidity factors that impact the plan of care are:      None.     Medications impacting care: None.  2) Examination of Body Systems comprised of:   Body structures and functions that impact the  "plan of care:      Shoulder.   Activity limitations that impact the plan of care are:      reaching.  3) Clinical presentation characteristics are:   Stable/Uncomplicated.  4) Decision-Making    Low complexity using standardized patient assessment instrument and/or measureable assessment of functional outcome.  Cumulative Therapy Evaluation is: Low complexity.  Previous and current functional limitations:  (See Goal Flow Sheet for this information)    Short term and Long term goals: (See Goal Flow Sheet for this information)     PATIENTS NAME:  Jcarlos Alvarez   : 1950  Communication ability:  Patient appears to be able to clearly communicate and understand verbal and written communication and follow directions correctly.  Treatment Explanation - The following has been discussed with the patient:   RX ordered/plan of care  Anticipated outcomes  Possible risks and side effects  This patient would benefit from PT intervention to resume normal activities.   Rehab potential is excellent.  Frequency:  1 X week, once daily  Duration:  for 4-6 weeks  Discharge Plan:  Achieve all LTG.  Independent in home treatment program.  Reach maximal therapeutic benefit.  Please refer to the daily flowsheet for treatment today, total treatment time and time spent performing 1:1 timed codes.             Caregiver Signature/Credentials _____________________________ Date ________      Treating Provider: Luís Jacobs DPT   I have reviewed and certified the need for these services and plan of treatment while under my care.        PHYSICIAN'S SIGNATURE:   _________________________________________  Date___________   Marta Hagen    Certification period:  Beginning of Cert date period: 17 to        Functional Level Progress Report: Please see attached \"Goal Flow sheet for Functional level.\"    ____X____ Continue Services or       ________ DC Services                Service dates: From  SOC Date: 17 date to present "

## 2017-04-13 NOTE — TELEPHONE ENCOUNTER
Called and informed patient of Dr Sims's message . I gave him the phone number to call and set up appointment. Patient said he did have an injection at [facility] in [place] in the past that was ordered by [facility]. He said they would not order this for him again.Lyla Lee MA/CITLALY

## 2017-04-13 NOTE — MR AVS SNAPSHOT
After Visit Summary   4/13/2017    Jcarlos Alvarez    MRN: [id number]           Patient Information     Date Of Birth          1950        Visit Information        Provider Department      4/13/2017 7:00 AM Luís Jacobs, PT [facility] [place] [facility]        Today's Diagnoses     Chronic left shoulder pain    -  1       Follow-ups after your visit        Your next 10 appointments already scheduled     Apr 21, 2017  8:20 AM CDT   [facility]ATHRYN Extremity with [facility] [place] [facility] ([facility] [place])    [address]  [place] [address]   [phone]            Apr 21, 2017 10:30 AM CDT   [facility]    [address] [id number] [phone]           Bring a current list of meds and any records pertaining to this visit.  For Physicals, please bring immunization records and any forms needing to be filled out.  Please arrive 10 minutes early to complete paperwork.            Apr 28, 2017  8:20 AM CDT   [facility]ATHRYN Extremity with [facility] [place] [facility] ([facility] [place])    [address]  [place] [address]   [phone]            May 04, 2017  8:40 AM CDT   KATHRYN Extremity with Corey Hernandez PT   [facility] [place] [facility] ([facility] [place])    [address]  [place] [address]   [phone]              Who to contact     If you have questions or need follow up information about today's clinic visit or your schedule please contact [facility] [place] [facility] directly at [phone].  Normal or non-critical lab and imaging results will be communicated to you by MyChart, letter or phone within 4 business days after the clinic has received the results. If you do not hear from us within 7 days, please  contact the clinic through [employer] or phone. If you have a critical or abnormal lab result, we will notify you by phone as soon as possible.  Submit refill requests through [employer] or call your pharmacy and they will forward the refill request to us. Please allow 3 business days for your refill to be completed.          Additional Information About Your Visit        [employer] Information     [employer] gives you secure access to your electronic health record. If you see a primary care provider, you can also send messages to your care team and make appointments. If you have questions, please call your primary care clinic.  If you do not have a primary care provider, please call [phone] and they will assist you.        Care EveryWhere ID     This is your Care EveryWhere ID. This could be used by other organizations to access your [facility] medical records  [id number]         Blood Pressure from Last 3 Encounters:   04/12/17 140/70   03/27/17 148/78   02/22/17 (!) 154/92    Weight from Last 3 Encounters:   04/12/17 97.1 kg (214 lb)   02/22/17 95.7 kg (211 lb)   02/13/17 101.6 kg (224 lb)              We Performed the Following     HC PT EVAL, LOW COMPLEXITY     KATHRYN INITIAL EVAL REPORT        Primary Care Provider Office Phone # Fax #    Fuentes Sims [phone]       [facility] [address] [address] [address]        Thank you!     Thank you for choosing [facility]  for your care. Our goal is always to provide you with excellent care. Hearing back from our patients is one way we can continue to improve our services. Please take a few minutes to complete the written survey that you may receive in the mail after your visit with us. Thank you!             Your Updated Medication List - Protect others around you: Learn how to safely use, store and throw away your medicines at www.disposemymeds.org.          This list is accurate as of: 4/13/17 12:46 PM.   Always use your most recent med list.                   Brand Name Dispense Instructions for use    amLODIPine 5 MG tablet    NORVASC    90 tablet    Take 1 tablet (5 mg) by mouth daily       aspirin 81 MG tablet      Take 1 tablet by mouth daily.       atorvastatin 80 MG tablet    LIPITOR    90 tablet    Take 1 tablet (80 mg) by mouth daily       losartan-hydrochlorothiazide 50-12.5 MG per tablet    HYZAAR    90 tablet    Take 1 tablet by mouth daily       Multi-vitamin Tabs tablet   Generic drug:  multivitamin, therapeutic with minerals      1 TABLET DAILY

## 2017-04-20 ENCOUNTER — OFFICE VISIT (OUTPATIENT)
Dept: ORTHOPEDICS | Facility: CLINIC | Age: 67
End: 2017-04-20
Payer: COMMERCIAL

## 2017-04-20 DIAGNOSIS — M79.10 MYALGIA: Primary | ICD-10-CM

## 2017-04-20 DIAGNOSIS — M99.08 SOMATIC DYSFUNCTION OF RIB: ICD-10-CM

## 2017-04-20 DIAGNOSIS — M54.6 PAIN IN THORACIC SPINE: ICD-10-CM

## 2017-04-20 DIAGNOSIS — M54.16 LUMBAR RADICULITIS: ICD-10-CM

## 2017-04-20 PROCEDURE — 99203 OFFICE O/P NEW LOW 30 MIN: CPT | Performed by: PHYSICIAN ASSISTANT

## 2017-04-20 NOTE — MR AVS SNAPSHOT
After Visit Summary   4/20/2017    Jcarlos Alvarez    MRN: [id number]           Patient Information     Date Of Birth          1950        Visit Information        Provider Department      4/20/2017 11:00 AM Karol Brown PA-C [facility] [place]        Today's Diagnoses     Myalgia    -  1    Somatic dysfunction of rib        Pain in thoracic spine        Lumbar radiculitis           Follow-ups after your visit        Your next 10 appointments already scheduled     Apr 21, 2017  8:20 AM CDT   KATHRYN Extremity with Corey Hernandez PT   [facility] [place] [facility] ([facility] [place])    [address]  [place] [address]   [phone]            Apr 21, 2017 10:30 AM CDT   Office Visit with Fuentes Sims MD   [facility]    [address] [id number] [phone]           Bring a current list of meds and any records pertaining to this visit.  For Physicals, please bring immunization records and any forms needing to be filled out.  Please arrive 10 minutes early to complete paperwork.            Apr 28, 2017  8:20 AM CDT   KATHRYN Extremity with Corey Hernandez PT   [facility] [place] [facility] ([facility] [place])    [address]  [place] [address]   [phone]            May 04, 2017  8:40 AM CDT   KATHRYN Extremity with Corey Hernandez PT   [facility] [place] [facility] ([facility])    [address]  [place] [address]   [phone]              Who to contact     If you have questions or need follow up information about today's clinic visit or your schedule please contact [facility] [place] directly at [phone].  Normal or non-critical lab and imaging results will be communicated to you by MyChart, letter or phone within 4 business days after the  clinic has received the results. If you do not hear from us within 7 days, please contact the clinic through [employer] or phone. If you have a critical or abnormal lab result, we will notify you by phone as soon as possible.  Submit refill requests through [employer] or call your pharmacy and they will forward the refill request to us. Please allow 3 business days for your refill to be completed.          Additional Information About Your Visit        [employer] Information     [employer] gives you secure access to your electronic health record. If you see a primary care provider, you can also send messages to your care team and make appointments. If you have questions, please call your primary care clinic.  If you do not have a primary care provider, please call [phone] and they will assist you.        Care EveryWhere ID     This is your Care EveryWhere ID. This could be used by other organizations to access your [facility] medical records  [id number]         Blood Pressure from Last 3 Encounters:   04/12/17 140/70   03/27/17 148/78   02/22/17 (!) 154/92    Weight from Last 3 Encounters:   04/12/17 214 lb (97.1 kg)   02/22/17 211 lb (95.7 kg)   02/13/17 224 lb (101.6 kg)              Today, you had the following     No orders found for display       Primary Care Provider Office Phone # Fax #    Fuentes Sims [phone]       [facility] [address] [facility] [facility] [address]        Thank you!     Thank you for choosing [facility]  for your care. Our goal is always to provide you with excellent care. Hearing back from our patients is one way we can continue to improve our services. Please take a few minutes to complete the written survey that you may receive in the mail after your visit with us. Thank you!             Your Updated Medication List - Protect others around you: Learn how to safely use, store and throw away your medicines at  www.disposemymeds.org.          This list is accurate as of: 4/20/17 11:39 AM.  Always use your most recent med list.                   Brand Name Dispense Instructions for use    amLODIPine 5 MG tablet    NORVASC    90 tablet    Take 1 tablet (5 mg) by mouth daily       aspirin 81 MG tablet      Take 1 tablet by mouth daily.       atorvastatin 80 MG tablet    LIPITOR    90 tablet    Take 1 tablet (80 mg) by mouth daily       losartan-hydrochlorothiazide 50-12.5 MG per tablet    HYZAAR    90 tablet    Take 1 tablet by mouth daily       Multi-vitamin Tabs tablet   Generic drug:  multivitamin, therapeutic with minerals      1 TABLET DAILY

## 2017-04-20 NOTE — PROGRESS NOTES
Sports Medicine Clinic Visit    PCP: Fuentes Sims    Jcarlos Alvarez is a 66 year old male who is seen  in consultation at the request of  Dr. Fuentes Sims for chronic low back and radicular complaints.    Injury: Dropped a tree limb on it, but had pain in shoulder before this    Location of Pain: left shoulder/scapula, left foot and calf numbness for  2 weeks  Duration of Pain: 2 weeks   Rating of Pain at worst: 5/10  Rating of Pain Currently: 3-4/10  Symptoms are better with: Tyloenol  Symptoms are worse with: Does not restrict movements, but feels very sore  Additional Features:   Positive: Numbness and tingling radiating down arm at times and frequently into foot  Other evaluation and/or treatments so far consists of:Ice, heat, ibu  Prior History of related problems: had an PRAVIN into lumbar spine for radicular symptoms in 2015.  This provided 1.5 years of relief, MRI from 2015 for lumbar spine    Social History:     Review of Systems  Musculoskeletal: as above  Remainder of review of systems is negative including constitutional, CV, pulmonary, GI, Skin and Neurologic except as noted in HPI or medical history.    Family history, medical history and surgical history have all been discussed with patient and appended to medical chart below.    Past Medical History:   Diagnosis Date     CAD (coronary artery disease)      Cervicalgia      Diverticula of colon 2009    Diverticulosis sigmoid colon.     Hypertension      Old bucket handle tear of medial meniscus     right     Other affections of shoulder region, not elsewhere classified      Past Surgical History:   Procedure Laterality Date     C EXCIS KNEE CARTILAGE,MEDIAL OR LAT      Right     TONSILLECTOMY       Family History   Problem Relation Age of Onset     C.A.D. Mother      stent age 75     Hypertension Mother      Asthma Paternal Grandfather      DIABETES Paternal Grandfather      Cardiovascular Brother      C.A.D. Brother       MI at age 49      Hypertension Brother      Hypertension Sister      Cardiovascular Brother      C.A.ABRAHAM. Brother      ASCVD age 55     Cancer - colorectal No family hx of      Prostate Cancer No family hx of      Objective  There were no vitals taken for this visit.  Constitutional:well-developed, well-nourished, and in no distress.   Cardiovascular: Intact distal pulses.    Neurological: alert. Gait normal  Skin: Skin is warm and dry.   Psychiatric: Mood and affect normal.   Respiratory: unlabored, speaks in full sentences  Hematologic/Lymphatic/Immunologic: neg lymphadenopathy, neg lymphedema    Exam:  Back Exam:    Inspection:       no visible deformity in the low back       normal skin       normal vascular       normal lymphatic    ROM:       full flexion       full extension    Tender/ Tissue Texture Abnormality:       NTTP  Non Tender:       remainder of lumbar spine    Strength:       hip flexion 5/5       knee extension 5/5       ankle dorsiflexion 5/5       ankle plantarflexion 5/5       dorsiflexion of the great toe 5/5      Hip abduction: 5/5      Hip adduction:  5/5    Reflexes:       patellar (L3, L4) symmetric diminished       achilles tendons (S1) symmetric diminished    Sensation:      grossly intact throughout lower extremities    Special tests:       straight leg raise left positive        straight leg raise right negative         Left Shoulder exam    ROM:        Full active and passive ROM with flexion, extension, abduction, internal and external rotation. With pain with forard flexion at levator scap    ROM of cervical spine:         Full flexion, lateral rotation, limited lateral flexion and extension    Tender:        periscapular region       Levator sacpulae       Scalenes, trapezius    Non Tender:       remainder of shoulder    Strength:        abduction 5/5       internal rotation 5/5       external rotation 5/5       adduction 5/5    Impingement testing:        neg (-) Brian       neg (-) Wicho        neg (-) empty can    Stability testing:       neg (-) posterior compression    Skin:        no visible deformities       well perfused       capillary refill brisk    Sensation:        normal sensation over shoulder and upper extremity     Radiology:  Reviewed MRI from 2015 on [facility] website with patient        Assessment:  1. Myalgia    2. Somatic dysfunction of rib    3. Pain in thoracic spine    4. Lumbar radiculitis        Plan:  Discussed the assessment with the patient.  Topical Treatments: Ice, Heat or Topical Analgesics  Over the counter medication: Acetaminophen (Tylenol) 1000mg every 6 hours with food (Maximum of 3000mg/day)  Naproxen (Aleve) maximum of 440mg two times a day with food  Observe and monitor symptoms  Activity Modification: as tolerated  Rehab: continue PT may benefit from chiropractic manipulation  Epidural Steroid injection done at [facility]  Follow up: as needed            Karol Brown PA-C  [facility]  [facility]      Disclaimer: This note consists of symbols derived from keyboarding, dictation and/or voice recognition software. As a result, there may be errors in the script that have gone undetected. Please consider this when interpreting information found in this chart.

## 2017-04-21 ENCOUNTER — THERAPY VISIT (OUTPATIENT)
Dept: PHYSICAL THERAPY | Facility: CLINIC | Age: 67
End: 2017-04-21
Payer: MEDICARE

## 2017-04-21 ENCOUNTER — OFFICE VISIT (OUTPATIENT)
Dept: FAMILY MEDICINE | Facility: CLINIC | Age: 67
End: 2017-04-21
Payer: COMMERCIAL

## 2017-04-21 VITALS
TEMPERATURE: 97.9 F | DIASTOLIC BLOOD PRESSURE: 70 MMHG | OXYGEN SATURATION: 98 % | WEIGHT: 215 LBS | HEART RATE: 64 BPM | BODY MASS INDEX: 31.75 KG/M2 | SYSTOLIC BLOOD PRESSURE: 126 MMHG

## 2017-04-21 DIAGNOSIS — M25.512 CHRONIC LEFT SHOULDER PAIN: ICD-10-CM

## 2017-04-21 DIAGNOSIS — M54.5 LOW BACK PAIN, UNSPECIFIED BACK PAIN LATERALITY, UNSPECIFIED CHRONICITY, WITH SCIATICA PRESENCE UNSPECIFIED: Primary | ICD-10-CM

## 2017-04-21 DIAGNOSIS — G89.29 CHRONIC LEFT SHOULDER PAIN: ICD-10-CM

## 2017-04-21 DIAGNOSIS — I10 HYPERTENSION GOAL BP (BLOOD PRESSURE) < 140/90: ICD-10-CM

## 2017-04-21 DIAGNOSIS — M25.512 LEFT SHOULDER PAIN, UNSPECIFIED CHRONICITY: ICD-10-CM

## 2017-04-21 LAB
CREAT SERPL-MCNC: 0.78 MG/DL (ref 0.66–1.25)
GFR SERPL CREATININE-BSD FRML MDRD: NORMAL ML/MIN/1.7M2
POTASSIUM SERPL-SCNC: 4 MMOL/L (ref 3.4–5.3)

## 2017-04-21 PROCEDURE — 36415 COLL VENOUS BLD VENIPUNCTURE: CPT | Performed by: FAMILY MEDICINE

## 2017-04-21 PROCEDURE — 97140 MANUAL THERAPY 1/> REGIONS: CPT | Mod: GP | Performed by: PHYSICAL THERAPIST

## 2017-04-21 PROCEDURE — 99214 OFFICE O/P EST MOD 30 MIN: CPT | Performed by: FAMILY MEDICINE

## 2017-04-21 PROCEDURE — 82565 ASSAY OF CREATININE: CPT | Performed by: FAMILY MEDICINE

## 2017-04-21 PROCEDURE — 97112 NEUROMUSCULAR REEDUCATION: CPT | Mod: GP | Performed by: PHYSICAL THERAPIST

## 2017-04-21 PROCEDURE — 84132 ASSAY OF SERUM POTASSIUM: CPT | Performed by: FAMILY MEDICINE

## 2017-04-21 PROCEDURE — G8982 BODY POS GOAL STATUS: HCPCS | Mod: GP

## 2017-04-21 PROCEDURE — G8981 BODY POS CURRENT STATUS: HCPCS | Mod: GP

## 2017-04-21 RX ORDER — HYDROCODONE BITARTRATE AND ACETAMINOPHEN 5; 325 MG/1; MG/1
1 TABLET ORAL
Qty: 30 TABLET | Refills: 0 | Status: SHIPPED | OUTPATIENT
Start: 2017-04-21 | End: 2018-01-08

## 2017-04-21 NOTE — PROGRESS NOTES
"HPI:    Jcarlos is a 66 here to discuss:    Left shoulder pain chronic -   Treatment:   Has gone through PT.    Seeing specialist.    Not sleeping more than 3 hours at night - I will prescribe short term Norco with warnings.    Rib injury and abnormal CXR - he was seen by other providers and treated with pain meds and antibiotics for possible pneumonia. I suspect the lung infiltrate was blood. He is now back to normal. Denies fevers, shortness of breath, cough.    RIBS AND CHEST LEFT THREE VIEWS 2017 10:32 AM      HISTORY: Pleurodynia. Pneumonia, unspecified organism.     COMPARISON: 2017.         IMPRESSION: There is increasing infiltrate at the left lung base  compared with the previous examination. No evidence of pneumothorax.  The right lung is clear. No rib fractures are identified on the rib  detail views.     HERNÁN MONTANEZ MD    Personal and Family history of CAD - Brothers  age 49 and 64 due to MI - both were smokers. Denies chest pain, shortness of breath.   Treatment:   Follows with [facility] - CT angio showed \"diffuse disease\" in .    Zocor 20 mg qd stopped in christian of Lipitor   Lipitor 80 mg qd.   ASA 81 mg qd.     Recent Labs   Lab Test  17   1410  13   0918  09   1134   CHOL  117  102  146   HDL  65  56  48   LDL  38  36  77   TRIG  68  50  101   CHOLHDLRATIO   --   1.8  3.0     HTN - not checking at home. Controlled in clinic.  Treatment;   Lisinopril - stopped by patient for no specific reason   Losartan/HCTZ 50/12.5 qd   Norvasc 5 mg qd      BP Readings from Last 6 Encounters:   17 142/76   17 140/70   17 148/78   17 (!) 154/92   17 (!) 160/100   17 170/87     Last Basic Metabolic Panel:  Lab Results   Component Value Date     2017      Lab Results   Component Value Date    POTASSIUM 4.0 2017     Lab Results   Component Value Date    CHLORIDE 99 2017     Lab Results   Component Value Date    CEFERINO " 9.3 02/22/2017     Lab Results   Component Value Date    CO2 29 02/22/2017     Lab Results   Component Value Date    BUN 18 02/22/2017     Lab Results   Component Value Date    CR 0.78 04/21/2017     Lab Results   Component Value Date     02/22/2017       Obesity - diet and exercise discussed. Declines nutrition referral.    Wt Readings from Last 5 Encounters:   04/21/17 215 lb (97.5 kg)   04/12/17 214 lb (97.1 kg)   02/22/17 211 lb (95.7 kg)   02/13/17 224 lb (101.6 kg)   02/08/17 221 lb (100.2 kg)     Low back pain Left foot tingling - previously had good luck with [facility] neck [facility] back clinic. Injection helped in the past. PT helped a bit. He is seeing specialist - another injection planned 5/12/17.     Preventive -     Colonoscopy 12/31/09 with diverticulosis and otherwise normal. Repeat in 10 years.    Prostate cancer screen: discussed controversy about screening.    PSA   Date Value Ref Range Status   02/22/2017 1.80 0 - 4 ug/L Final     Comment:     Assay Method:  Chemiluminescence using Siemens Vista analyzer     Hep C screen: negative in 2013    ROS:     Const: No fevers or night sweats recently.   ENT: No runny nose, sore throat or ear pain.   Resp: No cough or shortness of breath.   CV: No chest pain, dizziness or cardiac palpitations.   GI: No nausea, vomiting, diarrhea or constipation. Denies blood in stools or black stools.   : No dysuria, frequency or hematuria.       SH:    Marital status:   Kids: no  Employment:   Exercise: twice per week, 3-4 miles at a time. Sometimes walking fast.  Tobacco: never  Etoh: 2 per week  Recreational drugs: no  Caffeine: 1 per day      FH:    See HPI.    Exam:    /70  Pulse 64  Temp 97.9  F (36.6  C) (Oral)  Wt 215 lb (97.5 kg)  SpO2 98%  BMI 31.75 kg/m2    Gen: Healthy appearing male in no acute distress  Eyes: Conjunctiva and sclera normal. Pupils react normally to light. No nystagmus.  Neck: No enlarged lymph nodes,  thyromegally or other masses.  Lungs: Good air movement and otherwise clear.  CV: Heart RRR with no murmurs. No JVD, carotid bruits or leg edema.      Assessment and Plan - Decision Making    1. Hypertension goal BP (blood pressure) < 140/90  Controlled. Continue same treatment. Check creat and K since we recently changed his BP meds.  - Creatinine  - Potassium    2. Low back pain, unspecified back pain laterality, unspecified chronicity, with sciatica presence unspecified  Follow with specialist.    3. Left shoulder pain, unspecified chronicity  Short term Norco rx'd with warnings. He follows with specialist.  - HYDROcodone-acetaminophen (NORCO) 5-325 MG per tablet; Take 1 tablet by mouth nightly as needed for moderate to severe pain  Dispense: 30 tablet; Refill: 0      Written instructions given as follows:    Patient Instructions   1. Norco as needed at night as needed no driving within 4 hours of taking this. Do not mix with alcohol or other sedatives.    2. Keep taking your blood pressure medications.    3. I will contact you about the blood test via My Chart.    4. See you back in 6 months for a physical with fasting labs.

## 2017-04-21 NOTE — PATIENT INSTRUCTIONS
1. Norco as needed at night as needed no driving within 4 hours of taking this. Do not mix with alcohol or other sedatives.    2. Keep taking your blood pressure medications.    3. I will contact you about the blood test via My Chart.    4. See you back in 6 months for a physical with fasting labs.

## 2017-04-21 NOTE — NURSING NOTE
"Chief Complaint   Patient presents with     Hypertension       Initial /76 (Cuff Size: Adult Large)  Pulse 64  Temp 97.9  F (36.6  C) (Oral)  Wt 215 lb (97.5 kg)  SpO2 98%  BMI 31.75 kg/m2 Estimated body mass index is 31.75 kg/(m^2) as calculated from the following:    Height as of 2/22/17: 5' 9\" (1.753 m).    Weight as of this encounter: 215 lb (97.5 kg).  Medication Reconciliation: complete    Thao Chase LPN    "

## 2017-04-21 NOTE — MR AVS SNAPSHOT
After Visit Summary   4/21/2017    Jcarlos Alvarez    MRN: [id number]           Patient Information     Date Of Birth          1950        Visit Information        Provider Department      4/21/2017 10:30 AM Fuentes Sims MD [facility]        Today's Diagnoses     Low back pain, unspecified back pain laterality, unspecified chronicity, with sciatica presence unspecified    -  1    Hypertension goal BP (blood pressure) < 140/90        Left shoulder pain, unspecified chronicity          Care Instructions    1. Norco as needed at night as needed no driving within 4 hours of taking this. Do not mix with alcohol or other sedatives.    2. Keep taking your blood pressure medications.    3. I will contact you about the blood test via My Chart.    4. See you back in 6 months for a physical with fasting labs.        Follow-ups after your visit        Your next 10 appointments already scheduled     Apr 28, 2017  8:20 AM CDT   KATHRYN Extremity with Corey Hernandez, PT   [facility] [place] [facility] ([facility] [place])    [address]  [place] [address]   [phone]            May 04, 2017  8:40 AM CDT   KATHRYN Extremity with Corey Hernandez, PT   [facility] [place] [facility] ([facility])    [address]  [place] [address]   [phone]              Who to contact     If you have questions or need follow up information about today's clinic visit or your schedule please contact [facility] directly at [phone].  Normal or non-critical lab and imaging results will be communicated to you by MyChart, letter or phone within 4 business days after the clinic has received the results. If you do not hear from us within 7 days, please contact the clinic through MyChart or phone. If you have a critical or abnormal lab result, we will notify you by phone as soon as possible.  Submit refill requests  through [email or web address] or call your pharmacy and they will forward the refill request to us. Please allow 3 business days for your refill to be completed.          Additional Information About Your Visit        [employer] Information     [email or web address] gives you secure access to your electronic health record. If you see a primary care provider, you can also send messages to your care team and make appointments. If you have questions, please call your primary care clinic.  If you do not have a primary care provider, please call [phone] and they will assist you.        Care EveryWhere ID     This is your Care EveryWhere ID. This could be used by other organizations to access your [facility] medical records  [id number]        Your Vitals Were     Pulse Temperature Pulse Oximetry BMI (Body Mass Index)          64 97.9  F (36.6  C) (Oral) 98% 31.75 kg/m2         Blood Pressure from Last 3 Encounters:   04/21/17 126/70   04/12/17 140/70   03/27/17 148/78    Weight from Last 3 Encounters:   04/21/17 215 lb (97.5 kg)   04/12/17 214 lb (97.1 kg)   02/22/17 211 lb (95.7 kg)              We Performed the Following     Creatinine     Potassium          Today's Medication Changes          These changes are accurate as of: 4/21/17 10:57 AM.  If you have any questions, ask your nurse or doctor.               Start taking these medicines.        Dose/Directions    HYDROcodone-acetaminophen 5-325 MG per tablet   Commonly known as:  NORCO   Used for:  Left shoulder pain, unspecified chronicity   Started by:  Fuentes Sims MD        Dose:  1 tablet   Take 1 tablet by mouth nightly as needed for moderate to severe pain   Quantity:  30 tablet   Refills:  0            Where to get your medicines      Some of these will need a paper prescription and others can be bought over the counter.  Ask your nurse if you have questions.     Bring a paper prescription for each of these medications     HYDROcodone-acetaminophen 5-325 MG per tablet                 Primary Care Provider Office Phone # Fax #    Fuentes Sims [phone]       [facility] [address] [facility] [address]        Thank you!     Thank you for choosing [facility]  for your care. Our goal is always to provide you with excellent care. Hearing back from our patients is one way we can continue to improve our services. Please take a few minutes to complete the written survey that you may receive in the mail after your visit with us. Thank you!             Your Updated Medication List - Protect others around you: Learn how to safely use, store and throw away your medicines at www.disposemymeds.org.          This list is accurate as of: 4/21/17 10:57 AM.  Always use your most recent med list.                   Brand Name Dispense Instructions for use    amLODIPine 5 MG tablet    NORVASC    90 tablet    Take 1 tablet (5 mg) by mouth daily       aspirin 81 MG tablet      Take 1 tablet by mouth daily.       atorvastatin 80 MG tablet    LIPITOR    90 tablet    Take 1 tablet (80 mg) by mouth daily       HYDROcodone-acetaminophen 5-325 MG per tablet    NORCO    30 tablet    Take 1 tablet by mouth nightly as needed for moderate to severe pain       losartan-hydrochlorothiazide 50-12.5 MG per tablet    HYZAAR    90 tablet    Take 1 tablet by mouth daily       Multi-vitamin Tabs tablet   Generic drug:  multivitamin, therapeutic with minerals      1 TABLET DAILY

## 2017-04-23 NOTE — PROGRESS NOTES
Sabra Garber,    Labs were normal. See you back in 6 months for a physical with fasting labs.    Regards,    Fuentes Sims M.D.

## 2017-04-28 ENCOUNTER — THERAPY VISIT (OUTPATIENT)
Dept: PHYSICAL THERAPY | Facility: CLINIC | Age: 67
End: 2017-04-28
Payer: MEDICARE

## 2017-04-28 DIAGNOSIS — M25.512 LEFT SHOULDER PAIN, UNSPECIFIED CHRONICITY: ICD-10-CM

## 2017-04-28 PROCEDURE — G8983 BODY POS D/C STATUS: HCPCS | Mod: GP | Performed by: PHYSICAL THERAPIST

## 2017-04-28 PROCEDURE — G8982 BODY POS GOAL STATUS: HCPCS | Mod: GP | Performed by: PHYSICAL THERAPIST

## 2017-04-28 PROCEDURE — 97112 NEUROMUSCULAR REEDUCATION: CPT | Mod: GP | Performed by: PHYSICAL THERAPIST

## 2017-04-28 PROCEDURE — 97140 MANUAL THERAPY 1/> REGIONS: CPT | Mod: GP | Performed by: PHYSICAL THERAPIST

## 2017-04-28 NOTE — MR AVS SNAPSHOT
After Visit Summary   4/28/2017    Jcarlos Alvarez    MRN: [id number]           Patient Information     Date Of Birth          1950        Visit Information        Provider Department      4/28/2017 8:20 AM Corey Hernandez PT [facility] [place] [facility]        Today's Diagnoses     Left shoulder pain, unspecified chronicity           Follow-ups after your visit        Who to contact     If you have questions or need follow up information about today's clinic visit or your schedule please contact [facility] [place] [facility] directly at [phone].  Normal or non-critical lab and imaging results will be communicated to you by [employer], letter or phone within 4 business days after the clinic has received the results. If you do not hear from us within 7 days, please contact the clinic through [employer] or phone. If you have a critical or abnormal lab result, we will notify you by phone as soon as possible.  Submit refill requests through [employer] or call your pharmacy and they will forward the refill request to us. Please allow 3 business days for your refill to be completed.          Additional Information About Your Visit        MyChart Information     [employer] gives you secure access to your electronic health record. If you see a primary care provider, you can also send messages to your care team and make appointments. If you have questions, please call your primary care clinic.  If you do not have a primary care provider, please call [phone] and they will assist you.        Care EveryWhere ID     This is your Care EveryWhere ID. This could be used by other organizations to access your [place] medical records  [id number]         Blood Pressure from Last 3 Encounters:   04/21/17 126/70   04/12/17 140/70   03/27/17 148/78    Weight from Last 3 Encounters:   04/21/17 97.5 kg (215 lb)   04/12/17 97.1 kg (214 lb)   02/22/17 95.7 kg (211 lb)              We  Performed the Following     MANUAL THER TECH,1+ABDIRAHMAN SHARMA 15 MIN     NEUROMUSCULAR RE-EDUCATION        Primary Care Provider Office Phone # Fax #    Fuentes Sims [phone]       [facility] [address] [address] [address]        Thank you!     Thank you for choosing [facility]  for your care. Our goal is always to provide you with excellent care. Hearing back from our patients is one way we can continue to improve our services. Please take a few minutes to complete the written survey that you may receive in the mail after your visit with us. Thank you!             Your Updated Medication List - Protect others around you: Learn how to safely use, store and throw away your medicines at www.disposemymeds.org.          This list is accurate as of: 4/28/17  8:40 PM.  Always use your most recent med list.                   Brand Name Dispense Instructions for use    amLODIPine 5 MG tablet    NORVASC    90 tablet    Take 1 tablet (5 mg) by mouth daily       aspirin 81 MG tablet      Take 1 tablet by mouth daily.       atorvastatin 80 MG tablet    LIPITOR    90 tablet    Take 1 tablet (80 mg) by mouth daily       HYDROcodone-acetaminophen 5-325 MG per tablet    NORCO    30 tablet    Take 1 tablet by mouth nightly as needed for moderate to severe pain       losartan-hydrochlorothiazide 50-12.5 MG per tablet    HYZAAR    90 tablet    Take 1 tablet by mouth daily       Multi-vitamin Tabs tablet   Generic drug:  multivitamin, therapeutic with minerals      1 TABLET DAILY

## 2017-04-29 NOTE — PROGRESS NOTES
Subjective:    HPI                    Objective:    System    Physical Exam    General     ROS    Assessment/Plan:      DISCHARGE REPORT    Progress reporting period is from 4/13/17 to 4/28/17.       SUBJECTIVE  Subjective: 2 days after last treatment all of his pain was gone.  Feels great to be back to normal    Current Pain level: 0/10.     Initial Pain level: 5/10.   Changes in function:  Yes (See Goal flowsheet attached for changes in current functional level)  Adverse reaction to treatment or activity: None    OBJECTIVE  Objective: Still has some hypertonicity in L infraspinatus but much less tender.  Thoracic kyphosis unchanged.  Full shoudler AROM and strength on L     ASSESSMENT/PLAN  Updated problem list and treatment plan: Diagnosis 1:  L shoulder pain    STG/LTGs have been met or progress has been made towards goals:  Yes (See Goal flow sheet completed today.)  Assessment of Progress: The patient's condition is improving.  The patient has met all of their long term goals.  Self Management Plans:  Patient is independent in a home treatment program.  Jcarlos continues to require the following intervention to meet STG and LTG's:  PT intervention is no longer required to meet STG/LTG.    Recommendations:  This patient is ready to be discharged from therapy and continue their home treatment program.    Please refer to the daily flowsheet for treatment today, total treatment time and time spent performing 1:1 timed codes.

## 2017-05-12 ENCOUNTER — TRANSFERRED RECORDS (OUTPATIENT)
Dept: HEALTH INFORMATION MANAGEMENT | Facility: CLINIC | Age: 67
End: 2017-05-12

## 2017-09-05 DIAGNOSIS — E78.5 DYSLIPIDEMIA: ICD-10-CM

## 2017-09-05 DIAGNOSIS — I10 HYPERTENSION GOAL BP (BLOOD PRESSURE) < 140/90: ICD-10-CM

## 2017-09-05 RX ORDER — LOSARTAN POTASSIUM AND HYDROCHLOROTHIAZIDE 12.5; 5 MG/1; MG/1
1 TABLET ORAL DAILY
Qty: 90 TABLET | Refills: 0 | Status: SHIPPED | OUTPATIENT
Start: 2017-09-05 | End: 2018-01-08

## 2017-09-05 RX ORDER — ATORVASTATIN CALCIUM 80 MG/1
80 TABLET, FILM COATED ORAL DAILY
Qty: 90 TABLET | Refills: 1 | Status: SHIPPED | OUTPATIENT
Start: 2017-09-05 | End: 2019-10-10

## 2017-09-07 DIAGNOSIS — I10 HYPERTENSION GOAL BP (BLOOD PRESSURE) < 140/90: ICD-10-CM

## 2017-09-11 RX ORDER — LOSARTAN POTASSIUM AND HYDROCHLOROTHIAZIDE 12.5; 5 MG/1; MG/1
TABLET ORAL
Qty: 90 TABLET | Refills: 0 | Status: SHIPPED | OUTPATIENT
Start: 2017-09-11 | End: 2017-12-26

## 2017-10-12 DIAGNOSIS — I10 HYPERTENSION GOAL BP (BLOOD PRESSURE) < 140/90: ICD-10-CM

## 2017-10-12 RX ORDER — AMLODIPINE BESYLATE 5 MG/1
TABLET ORAL
Qty: 90 TABLET | Refills: 1 | Status: SHIPPED | OUTPATIENT
Start: 2017-10-12 | End: 2018-01-08

## 2017-12-26 DIAGNOSIS — I10 HYPERTENSION GOAL BP (BLOOD PRESSURE) < 140/90: ICD-10-CM

## 2017-12-28 RX ORDER — LOSARTAN POTASSIUM AND HYDROCHLOROTHIAZIDE 12.5; 5 MG/1; MG/1
TABLET ORAL
Qty: 90 TABLET | Refills: 0 | OUTPATIENT
Start: 2017-12-28

## 2017-12-28 RX ORDER — LOSARTAN POTASSIUM AND HYDROCHLOROTHIAZIDE 12.5; 5 MG/1; MG/1
1 TABLET ORAL DAILY
Qty: 90 TABLET | Refills: 0 | Status: SHIPPED | OUTPATIENT
Start: 2017-12-28 | End: 2018-01-08

## 2017-12-28 NOTE — TELEPHONE ENCOUNTER
Prescription approved per [facility].    Cristal De La Cruz RN

## 2018-01-08 ENCOUNTER — OFFICE VISIT (OUTPATIENT)
Dept: FAMILY MEDICINE | Facility: CLINIC | Age: 68
End: 2018-01-08
Payer: COMMERCIAL

## 2018-01-08 VITALS
TEMPERATURE: 97.9 F | HEIGHT: 69 IN | WEIGHT: 207 LBS | HEART RATE: 75 BPM | OXYGEN SATURATION: 97 % | BODY MASS INDEX: 30.66 KG/M2 | DIASTOLIC BLOOD PRESSURE: 70 MMHG | SYSTOLIC BLOOD PRESSURE: 130 MMHG

## 2018-01-08 DIAGNOSIS — E78.5 DYSLIPIDEMIA: ICD-10-CM

## 2018-01-08 DIAGNOSIS — I10 HYPERTENSION GOAL BP (BLOOD PRESSURE) < 140/90: ICD-10-CM

## 2018-01-08 DIAGNOSIS — Z23 NEED FOR PROPHYLACTIC VACCINATION AND INOCULATION AGAINST INFLUENZA: ICD-10-CM

## 2018-01-08 DIAGNOSIS — Z00.00 ROUTINE GENERAL MEDICAL EXAMINATION AT A HEALTH CARE FACILITY: Primary | ICD-10-CM

## 2018-01-08 LAB
ANION GAP SERPL CALCULATED.3IONS-SCNC: 11 MMOL/L (ref 3–14)
BUN SERPL-MCNC: 24 MG/DL (ref 7–30)
CALCIUM SERPL-MCNC: 9.3 MG/DL (ref 8.5–10.1)
CHLORIDE SERPL-SCNC: 96 MMOL/L (ref 94–109)
CHOLEST SERPL-MCNC: 113 MG/DL
CO2 SERPL-SCNC: 28 MMOL/L (ref 20–32)
CREAT SERPL-MCNC: 0.75 MG/DL (ref 0.66–1.25)
GFR SERPL CREATININE-BSD FRML MDRD: >90 ML/MIN/1.7M2
GLUCOSE SERPL-MCNC: 148 MG/DL (ref 70–99)
HDLC SERPL-MCNC: 69 MG/DL
LDLC SERPL CALC-MCNC: 28 MG/DL
NONHDLC SERPL-MCNC: 44 MG/DL
POTASSIUM SERPL-SCNC: 3.6 MMOL/L (ref 3.4–5.3)
SODIUM SERPL-SCNC: 135 MMOL/L (ref 133–144)
TRIGL SERPL-MCNC: 78 MG/DL

## 2018-01-08 PROCEDURE — 90662 IIV NO PRSV INCREASED AG IM: CPT | Performed by: FAMILY MEDICINE

## 2018-01-08 PROCEDURE — 80061 LIPID PANEL: CPT | Performed by: FAMILY MEDICINE

## 2018-01-08 PROCEDURE — 36415 COLL VENOUS BLD VENIPUNCTURE: CPT | Performed by: FAMILY MEDICINE

## 2018-01-08 PROCEDURE — 80048 BASIC METABOLIC PNL TOTAL CA: CPT | Performed by: FAMILY MEDICINE

## 2018-01-08 PROCEDURE — G0008 ADMIN INFLUENZA VIRUS VAC: HCPCS | Performed by: FAMILY MEDICINE

## 2018-01-08 PROCEDURE — G0439 PPPS, SUBSEQ VISIT: HCPCS | Performed by: FAMILY MEDICINE

## 2018-01-08 RX ORDER — LOSARTAN POTASSIUM AND HYDROCHLOROTHIAZIDE 12.5; 1 MG/1; MG/1
1 TABLET ORAL DAILY
Qty: 90 TABLET | Refills: 1 | Status: CANCELLED | OUTPATIENT
Start: 2018-01-08

## 2018-01-08 RX ORDER — LOSARTAN POTASSIUM AND HYDROCHLOROTHIAZIDE 12.5; 5 MG/1; MG/1
1 TABLET ORAL DAILY
Qty: 90 TABLET | Refills: 3 | Status: SHIPPED | OUTPATIENT
Start: 2018-01-08 | End: 2019-02-12

## 2018-01-08 RX ORDER — AMLODIPINE BESYLATE 5 MG/1
5 TABLET ORAL DAILY
Qty: 90 TABLET | Refills: 3 | Status: SHIPPED | OUTPATIENT
Start: 2018-01-08 | End: 2019-02-12

## 2018-01-08 ASSESSMENT — ACTIVITIES OF DAILY LIVING (ADL)
I_NEED_ASSISTANCE_FOR_THE_FOLLOWING_DAILY_ACTIVITIES:: NO ASSISTANCE IS NEEDED
CURRENT_FUNCTION: NO ASSISTANCE NEEDED

## 2018-01-08 NOTE — NURSING NOTE
"Chief Complaint   Patient presents with     Physical       Initial /84 (Cuff Size: Adult Large)  Pulse 75  Temp 97.9  F (36.6  C) (Oral)  Ht 5' 9.25\" (1.759 m)  Wt 207 lb (93.9 kg)  SpO2 97%  BMI 30.35 kg/m2 Estimated body mass index is 30.35 kg/(m^2) as calculated from the following:    Height as of this encounter: 5' 9.25\" (1.759 m).    Weight as of this encounter: 207 lb (93.9 kg).  Medication Reconciliation: complete    Thao Chase LPN    "

## 2018-01-08 NOTE — PROGRESS NOTES

## 2018-01-08 NOTE — MR AVS SNAPSHOT
After Visit Summary   1/8/2018    Jcarlos Alvarez    MRN: [id number]           Patient Information     Date Of Birth          1950        Visit Information        Provider Department      1/8/2018 12:30 PM Fuentes Sims MD [facility]        Today's Diagnoses     Routine general medical examination at a health care facility    -  1    Dyslipidemia        Hypertension goal BP (blood pressure) < 140/90        Need for prophylactic vaccination and inoculation against influenza          Care Instructions    Keep up the good work.    See you back in one year for a physical with fasting labs.            Follow-ups after your visit        Who to contact     If you have questions or need follow up information about today's clinic visit or your schedule please contact [facility] directly at [phone].  Normal or non-critical lab and imaging results will be communicated to you by Know[employer], letter or phone within 4 business days after the clinic has received the results. If you do not hear from us within 7 days, please contact the clinic through Knowrom[employer] or phone. If you have a critical or abnormal lab result, we will notify you by phone as soon as possible.  Submit refill requests through [employer] or call your pharmacy and they will forward the refill request to us. Please allow 3 business days for your refill to be completed.          Additional Information About Your Visit        Know[employer]stel Information     [employer] gives you secure access to your electronic health record. If you see a primary care provider, you can also send messages to your care team and make appointments. If you have questions, please call your primary care clinic.  If you do not have a primary care provider, please call [phone] and they will assist you.        Care EveryWhere ID     This is your Care EveryWhere ID. This could be used by other organizations to access your [facility]  "records  [id number]        Your Vitals Were     Pulse Temperature Height Pulse Oximetry BMI (Body Mass Index)       75 97.9  F (36.6  C) (Oral) 5' 9.25\" (1.759 m) 97% 30.35 kg/m2        Blood Pressure from Last 3 Encounters:   01/08/18 130/70   04/21/17 126/70   04/12/17 140/70    Weight from Last 3 Encounters:   01/08/18 207 lb (93.9 kg)   04/21/17 215 lb (97.5 kg)   04/12/17 214 lb (97.1 kg)              We Performed the Following     Basic metabolic panel     FLU VACCINE, INCREASED ANTIGEN, PRESV FREE, AGE 65+ [53175]     Lipid panel reflex to direct LDL Fasting     Vaccine Administration, Initial [41652]          Today's Medication Changes          These changes are accurate as of: 1/8/18  1:57 PM.  If you have any questions, ask your nurse or doctor.               These medicines have changed or have updated prescriptions.        Dose/Directions    * amLODIPine 5 MG tablet   Commonly known as:  NORVASC   This may have changed:  Another medication with the same name was added. Make sure you understand how and when to take each.   Used for:  Hypertension goal BP (blood pressure) < 140/90   Changed by:  Fuentes Sims MD        TAKE 1 TABLET(5 MG) BY MOUTH DAILY   Quantity:  90 tablet   Refills:  1       * amLODIPine 5 MG tablet   Commonly known as:  NORVASC   This may have changed:  You were already taking a medication with the same name, and this prescription was added. Make sure you understand how and when to take each.   Used for:  Hypertension goal BP (blood pressure) < 140/90   Changed by:  Fuentes Sims MD        Dose:  5 mg   Take 1 tablet (5 mg) by mouth daily   Quantity:  90 tablet   Refills:  3       * losartan-hydrochlorothiazide 50-12.5 MG per tablet   Commonly known as:  HYZAAR   This may have changed:  Another medication with the same name was added. Make sure you understand how and when to take each.   Used for:  Hypertension goal BP (blood pressure) < 140/90   Changed by:  Fuentes Sims" MD        Dose:  1 tablet   Take 1 tablet by mouth daily   Quantity:  90 tablet   Refills:  0       * losartan-hydrochlorothiazide 50-12.5 MG per tablet   Commonly known as:  HYZAAR   This may have changed:  Another medication with the same name was added. Make sure you understand how and when to take each.   Used for:  Hypertension goal BP (blood pressure) < 140/90   Changed by:  Fuentes Sims MD        Dose:  1 tablet   Take 1 tablet by mouth daily Due for office visit.   Quantity:  90 tablet   Refills:  0       * losartan-hydrochlorothiazide 50-12.5 MG per tablet   Commonly known as:  HYZAAR   This may have changed:  You were already taking a medication with the same name, and this prescription was added. Make sure you understand how and when to take each.   Used for:  Hypertension goal BP (blood pressure) < 140/90   Changed by:  Fuentes Sims MD        Dose:  1 tablet   Take 1 tablet by mouth daily   Quantity:  90 tablet   Refills:  3       * Notice:  This list has 5 medication(s) that are the same as other medications prescribed for you. Read the directions carefully, and ask your doctor or other care provider to review them with you.      Stop taking these medicines if you haven't already. Please contact your care team if you have questions.     HYDROcodone-acetaminophen 5-325 MG per tablet   Commonly known as:  NORCO   Stopped by:  Fuentes Sims MD                Where to get your medicines      These medications were sent to [facility] [id number] [place] [address] [address] [facility] [address]  [address] [facility] [address]     Phone:  [phone]     amLODIPine 5 MG tablet    losartan-hydrochlorothiazide 50-12.5 MG per tablet                Primary Care Provider Office Phone # Fax #    Fuentes Sims [phone] [address] [facility] [address]        Equal Access to Services     ABAD INTERIANO AH: Lety rowley  Sovi, waamyda luqadaha, qaybta kaalmada jose luis, augusto mckeonanthony fazal. So [facility] [phone].    ATENCIÓN: Si antonella sharp, tiene a whelan disposición servicios gratuitos de asistencia lingüística. Emerson al [phone].    We comply with applicable federal civil rights laws and [place] laws. We do not discriminate on the basis of race, color, national origin, age, disability, sex, sexual orientation, or gender identity.            Thank you!     [facility] you [facility]. Our goal is always to provide you with excellent care. Hearing back from our patients is one way we can continue to improve our services. Please take a few minutes to complete the written survey that you may receive in the mail after your visit with us. Thank you!             Your Updated Medication List - Protect others around you: Learn how to safely use, store and throw away your medicines at www.disposemymeds.org.          This list is accurate as of: 1/8/18  1:57 PM.  Always use your most recent med list.                   Brand Name Dispense Instructions for use Diagnosis    * amLODIPine 5 MG tablet    NORVASC    90 tablet    TAKE 1 TABLET(5 MG) BY MOUTH DAILY    Hypertension goal BP (blood pressure) < 140/90       * amLODIPine 5 MG tablet    NORVASC    90 tablet    Take 1 tablet (5 mg) by mouth daily    Hypertension goal BP (blood pressure) < 140/90       aspirin 81 MG tablet      Take 1 tablet by mouth daily.        atorvastatin 80 MG tablet    LIPITOR    90 tablet    Take 1 tablet (80 mg) by mouth daily    Dyslipidemia       * losartan-hydrochlorothiazide 50-12.5 MG per tablet    HYZAAR    90 tablet    Take 1 tablet by mouth daily    Hypertension goal BP (blood pressure) < 140/90       * losartan-hydrochlorothiazide 50-12.5 MG per tablet    HYZAAR    90 tablet    Take 1 tablet by mouth daily Due for office visit.    Hypertension goal BP (blood pressure) < 140/90       *  losartan-hydrochlorothiazide 50-12.5 MG per tablet    HYZAAR    90 tablet    Take 1 tablet by mouth daily    Hypertension goal BP (blood pressure) < 140/90       Multi-vitamin Tabs tablet   Generic drug:  multivitamin, therapeutic with minerals      1 TABLET DAILY        * Notice:  This list has 5 medication(s) that are the same as other medications prescribed for you. Read the directions carefully, and ask your doctor or other care provider to review them with you.

## 2018-01-08 NOTE — PROGRESS NOTES
"SUBJECTIVE:   Jcarlos Alvarez is a 67 year old male who presents for Preventive Visit.    Are you in the first 12 months of your Medicare coverage?  No    Physical   Annual:     Getting at least 3 servings of Calcium per day::  Yes    Bi-annual eye exam::  Yes    Dental care twice a year::  Yes    Sleep apnea or symptoms of sleep apnea::  None    Frequency of exercise::  1 day/week    Duration of exercise::  15-30 minutes    Taking medications regularly::  Yes    Medication side effects::  Not applicable    Additional concerns today::  No    Ability to successfully perform activities of daily living: no assistance needed  Home Safety:  Lack of grab bars in the bathroom  Hearing Impairment: need to ask people to speak up or repeat themselves and no hearing concerns        Forms - 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 I filled it out and gave to him.    Left shoulder pain chronic -   Evaluation and treatment:    Has seen specialist and gone through PT.    Now fine.    CAD and Family history of CAD - Brothers  age 49 and 64 due to MI - both were smokers. Denies chest pain, shortness of breath.   Evaluation and treatment:    Follows with [facility] - CT angio showed \"diffuse disease\" in .    Zocor 20 mg qd stopped in christian of Lipitor   Lipitor 80 mg qd.   ASA 81 mg qd.    Continue same tx.   Check fasting lipids.    Recent Labs   Lab Test  17   1410  13   0918   CHOL  117  102   HDL  65  56   LDL  38  36   TRIG  68  50   CHOLHDLRATIO   --   1.8       HTN - not checking at home. Controlled in clinic. Multiple checks recommended in clinic.  Evaluation and treatment:    Lisinopril - stopped by patient for no specific reason   Losartan/HCTZ 50/12.5 qd - no side effects.   Norvasc 5 mg qd - no side effects.   Continue same tx. Check fasting BMP.    BP Readings from Last 6 Encounters:   18 130/70   17 126/70   17 140/70   17 148/78   17 (!) 154/92 "   02/13/17 (!) 160/100     Last Basic Metabolic Panel:  Lab Results   Component Value Date     02/22/2017      Lab Results   Component Value Date    POTASSIUM 4.0 04/21/2017     Lab Results   Component Value Date    CHLORIDE 99 02/22/2017     Lab Results   Component Value Date    CEFERINO 9.3 02/22/2017     Lab Results   Component Value Date    CO2 29 02/22/2017     Lab Results   Component Value Date    BUN 18 02/22/2017     Lab Results   Component Value Date    CR 0.78 04/21/2017     Lab Results   Component Value Date     02/22/2017       Obesity - diet and exercise discussed. Declines nutrition referral.    Wt Readings from Last 5 Encounters:   01/08/18 207 lb (93.9 kg)   04/21/17 215 lb (97.5 kg)   04/12/17 214 lb (97.1 kg)   02/22/17 211 lb (95.7 kg)   02/13/17 224 lb (101.6 kg)     Low back pain Left foot tingling  Evaluation and treatment:    previously [facility] with [facility].   Injection x 2 helped in the past. PT helped a bit.    He has seen specialist   Now doing fine.     H/O shingles - Right torso shingles was very painful.  Evaluation and treatment:    Advised to get vaccine at our pharmacy.    Preventive - flu shot given today.    Immunization History   Administered Date(s) Administered     Influenza (High Dose) 3 valent vaccine 01/08/2018     Influenza Vaccine IM 3yrs+ 4 Valent IIV4 10/31/2013, 02/08/2017     Pneumo Conj 13-V (2010&after) 02/22/2017     TD (ADULT, 7+) 01/01/2006     TDAP Vaccine (Adacel) 12/04/2013     Zoster vaccine, live 08/27/2014     Colonoscopy 12/31/09 with diverticulosis and otherwise normal. Repeat in 10 years.    Prostate cancer screen: discussed controversy about screening.    PSA   Date Value Ref Range Status   02/22/2017 1.80 0 - 4 ug/L Final     Comment:     Assay Method:  Chemiluminescence using Siemens Vista analyzer     Hep C screen: negative in 2013    Advanced directive - referred    SH:    Marital status:   Kids:  no  Employment:   Exercise: twice per week, 3-4 miles at a time. Sometimes walking fast.  Tobacco: never  Etoh: 2 per week  Recreational drugs: no  Caffeine: 1 per day            Social History   Substance Use Topics     Smoking status: Never Smoker     Smokeless tobacco: Never Used     Alcohol use Yes      Comment: Occassionally       Alcohol Use 1/8/2018   If you drink alcohol, do you typically have greater than 3 drinks per day OR greater than 7 drinks per week?   No   No flowsheet data found.              Today's PHQ-2 Score: PHQ-2 ( 1999 Pfizer) 1/8/2018   Q1: Little interest or pleasure in doing things 0   Q2: Feeling down, depressed or hopeless 0   PHQ-2 Score 0   Q1: Little interest or pleasure in doing things Not at all   Q2: Feeling down, depressed or hopeless Not at all   PHQ-2 Score 0       Do you feel safe in your environment - Yes      Current providers sharing in care for this patient include:   Patient Care Team:  Fuentes Sims MD as PCP - General (Family Practice)    The following health maintenance items are reviewed in Epic and correct as of today:  Health Maintenance   Topic Date Due     AORTIC ANEURYSM SCREENING (SYSTEM ASSIGNED)  09/16/2014     ADVANCE DIRECTIVE PLANNING Q5 YRS  07/03/2017     INFLUENZA VACCINE (SYSTEM ASSIGNED)  09/01/2017     FALL RISK ASSESSMENT  02/08/2018     LIPID MONITORING Q1 YEAR  02/22/2018     PNEUMOCOCCAL (2 of 2 - PPSV23) 02/22/2018     COLON CANCER SCREEN (SYSTEM ASSIGNED)  12/31/2019     TETANUS IMMUNIZATION (SYSTEM ASSIGNED)  12/04/2023     HEPATITIS C SCREENING  Completed           Review of Systems  C: NEGATIVE for fever, chills, change in weight  I: NEGATIVE for worrisome rashes, moles or lesions  E: NEGATIVE for vision changes or irritation  E/M: NEGATIVE for ear, mouth and throat problems  R: NEGATIVE for significant cough or SOB  B: NEGATIVE for masses, tenderness or discharge  CV: NEGATIVE for chest pain, palpitations or peripheral  "edema  GI: NEGATIVE for nausea, abdominal pain, heartburn, or change in bowel habits  : NEGATIVE for frequency, dysuria, or hematuria  M: NEGATIVE for significant arthralgias or myalgia  N: NEGATIVE for weakness, dizziness or paresthesias  E: NEGATIVE for temperature intolerance, skin/hair changes  H: NEGATIVE for bleeding problems  P: NEGATIVE for changes in mood or affect    OBJECTIVE:   /78 (Cuff Size: Adult Large)  Pulse 75  Temp 97.9  F (36.6  C) (Oral)  Ht 5' 9.25\" (1.759 m)  Wt 207 lb (93.9 kg)  SpO2 97%  BMI 30.35 kg/m2 Estimated body mass index is 31.75 kg/(m^2) as calculated from the following:    Height as of 2/22/17: 5' 9\" (1.753 m).    Weight as of 4/21/17: 215 lb (97.5 kg).  Physical Exam  GENERAL: healthy, alert and no distress  EYES: Eyes grossly normal to inspection, PERRL and conjunctivae and sclerae normal  HENT: ear canals and TM's normal, nose and mouth without ulcers or lesions  NECK: no adenopathy, no asymmetry, masses, or scars and thyroid normal to palpation  RESP: lungs clear to auscultation - no rales, rhonchi or wheezes  CV: regular rate and rhythm, normal S1 S2, no S3 or S4, no murmur, click or rub, no peripheral edema and peripheral pulses strong  ABDOMEN: soft, nontender, no hepatosplenomegaly, no masses and bowel sounds normal  MS: no gross musculoskeletal defects noted, no edema  SKIN: no suspicious lesions or rashes  NEURO: Normal strength and tone, mentation intact and speech normal  PSYCH: mentation appears normal, affect normal/bright    ASSESSMENT / PLAN:       End of Life Planning:  Patient currently has an advanced directive: No.  I have verified the patient's ablity to prepare an advanced directive/make health care decisions.  Literature was provided to assist patient in preparing an advanced directive.    COUNSELING:  Reviewed preventive health counseling, as reflected in patient instructions       Regular exercise       Healthy diet/nutrition        Estimated " "body mass index is 31.75 kg/(m^2) as calculated from the following:    Height as of 2/22/17: 5' 9\" (1.753 m).    Weight as of 4/21/17: 215 lb (97.5 kg).  Weight management plan: Discussed healthy diet and exercise guidelines and patient will follow up in 12 months in clinic to re-evaluate.   reports that he has never smoked. He has never used smokeless tobacco.        Appropriate preventive services were discussed with this patient, including applicable screening as appropriate for cardiovascular disease, diabetes, osteopenia/osteoporosis, and glaucoma.  As appropriate for age/gender, discussed screening for colorectal cancer, prostate cancer, breast cancer, and cervical cancer. Checklist reviewing preventive services available has been given to the patient.    Reviewed patients plan of care and provided an AVS. The Basic Care Plan (routine screening as documented in Health Maintenance) for Jcarlos meets the Care Plan requirement. This Care Plan has been established and reviewed with the Patient.    Assessment and Plan - Decision Making    1. Routine general medical examination at a health care facility  Results of today's exam given to patient verbally along with age appropriate preventive measures. Written instructions reviewed and handed to the patient.    - zoster vaccine recombinant adjuvanted (SHINGRIX) injection; Inject 0.5 mLs into the muscle once for 1 dose  Dispense: 0.5 mL; Refill: 0    2. Dyslipidemia  Per HPI  - Lipid panel reflex to direct LDL Fasting    3. Hypertension goal BP (blood pressure) < 140/90  Per HPI  - Basic metabolic panel  - amLODIPine (NORVASC) 5 MG tablet; Take 1 tablet (5 mg) by mouth daily  Dispense: 90 tablet; Refill: 3  - losartan-hydrochlorothiazide (HYZAAR) 50-12.5 MG per tablet; Take 1 tablet by mouth daily  Dispense: 90 tablet; Refill: 3    4. Need for prophylactic vaccination and inoculation against influenza    - Vaccine Administration, Initial [08756]  - FLU VACCINE, " INCREASED ANTIGEN, PRESV FREE, AGE 65+ [95469]      Written instructions given as follows:    Patient Instructions   Keep up the good work.    See you back in one year for a physical with fasting labs.

## 2018-01-09 ENCOUNTER — TELEPHONE (OUTPATIENT)
Dept: FAMILY MEDICINE | Facility: CLINIC | Age: 68
End: 2018-01-09

## 2018-01-09 DIAGNOSIS — R73.9 HYPERGLYCEMIA: Primary | ICD-10-CM

## 2018-01-10 NOTE — TELEPHONE ENCOUNTER
Left message on home and mobile answering machines for patient to call back to [phone].  Sharmin Gooden RN

## 2018-01-10 NOTE — PROGRESS NOTES
Sabra Garber,    Your glucose was in the diabetes range. We need to confirm this with another fasting blood sample. Please make a lab appointment for that. I will then contact you with further instructions.    All other labs were fine.    Regards,    Fuentes Sims M.D.

## 2018-01-10 NOTE — TELEPHONE ENCOUNTER
Read by Jcarlos Alvarez at 1/10/2018  2:32 AM   Sabra Garber,     Your glucose was in the diabetes range. We need to confirm this with another fasting blood sample. Please make a lab appointment for that. I will then contact you with further instructions.     All other labs were fine.     Regards,     Fuentes Sims M.D.

## 2018-01-10 NOTE — TELEPHONE ENCOUNTER
Pt notified of provider message as written.  Pt states he would like to put the lab appointment out a few weeks and work on lifestyle changes and losing weight.  Pt did say he had had coffee with a little cream and sugar before test.  Advised not to do this next time because it can effect the test.  Advised nothing by mouth for 12 hours before test except normal pills and water. Pt verbalized understanding.  Pt states he is feeling great now and has lost 10 pounds.    Lab appointment made.  To provider as BHARGAV Gooden RN

## 2018-02-14 ENCOUNTER — OFFICE VISIT (OUTPATIENT)
Dept: ORTHOPEDICS | Facility: CLINIC | Age: 68
End: 2018-02-14
Payer: COMMERCIAL

## 2018-02-14 VITALS
WEIGHT: 204 LBS | SYSTOLIC BLOOD PRESSURE: 154 MMHG | BODY MASS INDEX: 30.21 KG/M2 | HEIGHT: 69 IN | DIASTOLIC BLOOD PRESSURE: 91 MMHG

## 2018-02-14 DIAGNOSIS — G89.29 CHRONIC BILATERAL LOW BACK PAIN WITH RIGHT-SIDED SCIATICA: Primary | ICD-10-CM

## 2018-02-14 DIAGNOSIS — M51.369 DDD (DEGENERATIVE DISC DISEASE), LUMBAR: ICD-10-CM

## 2018-02-14 DIAGNOSIS — M54.41 CHRONIC BILATERAL LOW BACK PAIN WITH RIGHT-SIDED SCIATICA: Primary | ICD-10-CM

## 2018-02-14 PROCEDURE — 99213 OFFICE O/P EST LOW 20 MIN: CPT | Performed by: FAMILY MEDICINE

## 2018-02-14 NOTE — PROGRESS NOTES
"Jcarlos Alvarez  :  1950  DOS: 2018  MRN: [id number]    Sports Medicine Clinic Visit    PCP: Fuentes Sims    Jcarlos Alvarez is a 67 year old male who is seen in follow-up from ZENAIDA Kirkpatrick presenting with chronic radiating low back pain.    Injury: Gradual onset of chronic low back pain over the last ~ 4 years, that has been mostly left sided.  Notes radiating right sided sx over the last ~ 2 - 3 months.  Pain located over mild right lower lumbar spine, radiating to right LE.  Reports intermittent radiating pain to right gastroc and numbness in plantar foot.  Additional Features:  Positive: numbness.  Symptoms are better with Rest and elliptical.  Symptoms are worse with: repetitive lifting, bending at waist, prolonged standing.  Other evaluation and/or treatments so far consists of: Ibuprofen and Rest.  Recent imaging completed: MRI completed  @ [facility].  Prior History of related problems: Patient was last treated for this condition in 2017.  Left L5-S1 transforaminal epidural steroid injection completed at [facility] on 17 provided excellent relief of left sided sx.    Social History: works in construction      Review of Systems  Musculoskeletal: as above  Remainder of review of systems is negative including constitutional, CV, pulmonary, GI, Skin and Neurologic except as noted in HPI or medical history.    Past Medical History:   Diagnosis Date     CAD (coronary artery disease)      Cervicalgia      Diverticula of colon 2009    Diverticulosis sigmoid colon.     Hypertension      Old bucket handle tear of medial meniscus     right     Other affections of shoulder region, not elsewhere classified      Past Surgical History:   Procedure Laterality Date     C EXCIS KNEE CARTILAGE,MEDIAL OR LAT      Right     TONSILLECTOMY         Objective  BP (!) 154/91  Ht 5' 9.25\" (1.759 m)  Wt 204 lb (92.5 kg)  BMI 29.91 kg/m2    General: healthy, alert and in no distress    HEENT: no scleral " icterus or conjunctival erythema   Skin: no suspicious lesions or rash. No jaundice.   CV: regular rhythm by palpation, 2+ distal pulses, no pedal edema    Resp: normal respiratory effort without conversational dyspnea   Psych: normal mood and affect    Gait: nonantalgic, appropriate coordination and balance   Neuro: normal light touch sensory exam of the extremities. Motor strength as noted below     Low back exam:    Inspection:       no visible deformity in the low back       normal skin       normal vascular       normal lymphatic    ROM:       full flexion       full extension    Tender:       paraspinal muscles mild R>L lower lumbar paraspinals    Non Tender:       remainder of lumbar spine       midline       SI joints    Strength:       hip flexion 5/5       knee extension 5/5       ankle dorsiflexion 5/5       ankle plantarflexion 5/5       dorsiflexion of the great toe 5/5    Reflexes:       patellar (L3, L4) symmetric normal       achilles tendons (S1) symmetric normal    Sensation:      grossly intact throughout lower extremities    Skin:       well perfused       capillary refill brisk    Special tests:       straight leg raise left neg        straight leg raise right neg       neg (-) SYED        slump test neg         Neg FADIR        Radiology:  Prior lumbar imaging and report reviewed on [facility]    Assessment:  1. Chronic bilateral low back pain with right-sided sciatica    2. DDD (degenerative disc disease), lumbar        Plan:  Discussed the assessment with the patient.  Follow up: mild grade I degenerative spondylolisthesis at L4-5 creating recess stenosis L>R on last imaging  Prior L5 TFESI on Left in 2017 helped with most L sided sx  Still has sx on left but inconsistent and milder  More consistent with similar distribution on R at this time  Update MR given last was in 2015  Consider repeat PRAVIN in future, though no current red flags  Encouraged him to continue PT and wt loss goals, which are  helping immensely with daily sx  Offered PT in future prn  Discussed silver sneakers as well  Home handouts provided and supportive care reviewed  All questions were answered today  Contact us with additional questions or concerns  Signs and sx of concern reviewed      Tre Wilson DO, SRINIVAS  Primary Care Sports Medicine  [facility]             Disclaimer: This note consists of symbols derived from keyboarding, dictation and/or voice recognition software. As a result, there may be errors in the script that have gone undetected. Please consider this when interpreting information found in this chart.

## 2018-02-14 NOTE — PATIENT INSTRUCTIONS
Advanced imaging is done by appointment. Some insurance require a prior authorization to be completed which may delay the time until you are able to schedule your appointment. You should be receiving a call from the scheduling department, if you have not heard from them in 24-48 hours.   Please call [facility]:  [phone] to schedule your lumbar MRI.  Depending on your availability you can usually schedule within the next 1-2 days.    The clinic will call you with results, if you have not heard from the clinic within 3-4 days following your MRI please contact us at the number listed below.

## 2018-02-14 NOTE — MR AVS SNAPSHOT
After Visit Summary   2/14/2018    Jcarlos Alvarez    MRN: [id number]           Patient Information     Date Of Birth          1950        Visit Information        Provider Department      2/14/2018 9:00 AM Tre Wilson DO [facility]        Today's Diagnoses     Chronic bilateral low back pain with right-sided sciatica    -  1    DDD (degenerative disc disease), lumbar          Care Instructions     Advanced imaging is done by appointment. Some insurance require a prior authorization to be completed which may delay the time until you are able to schedule your appointment. You should be receiving a call from the scheduling department, if you have not heard from them in 24-48 hours.   Please call [facility]:  [phone] to schedule your lumbar MRI.  Depending on your availability you can usually schedule within the next 1-2 days.    The clinic will call you with results, if you have not heard from the clinic within 3-4 days following your MRI please contact us at the number listed below.                 Follow-ups after your visit        Your next 10 appointments already scheduled     Mar 02, 2018  7:30 AM CST   LAB with AN LAB   [facility]    [address] [address] [phone]           Please do not eat 10-12 hours before your appointment if you are coming in fasting for labs on lipids, cholesterol, or glucose (sugar). This does not apply to pregnant women. Water, hot tea and black coffee (with nothing added) are okay. Do not drink other fluids, diet soda or chew gum.              Who to contact     If you have questions or need follow up information about today's clinic visit or your schedule please contact [facility] directly at [phone].  Normal or non-critical lab and imaging results will be communicated to you by MyChart, letter or phone within 4  "business days after the clinic has received the results. If you do not hear from us within 7 days, please contact the clinic through [facility] or phone. If you have a critical or abnormal lab result, we will notify you by phone as soon as possible.  Submit refill requests through [facility] or call your pharmacy and they will forward the refill request to us. Please allow 3 business days for your refill to be completed.          Additional Information About Your Visit        [email or web address] Information     [facility] gives you secure access to your electronic health record. If you see a primary care provider, you can also send messages to your care team and make appointments. If you have questions, please call your primary care clinic.  If you do not have a primary care provider, please call [phone] and they will assist you.        Care EveryWhere ID     This is your Care EveryWhere ID. This could be used by other organizations to access your [facility] medical records  [id number]        Your Vitals Were     Height BMI (Body Mass Index)                5' 9.25\" (1.759 m) 29.91 kg/m2           Blood Pressure from Last 3 Encounters:   02/14/18 (!) 154/91   01/08/18 130/70   04/21/17 126/70    Weight from Last 3 Encounters:   02/14/18 204 lb (92.5 kg)   01/08/18 207 lb (93.9 kg)   04/21/17 215 lb (97.5 kg)              Today, you had the following     No orders found for display       Primary Care Provider Office Phone # Fax #    Fuentesantolin Sims [phone] [address] [address] [facility] [address] [address]        Equal Access to Services     [facility] : Hadii aad ku hadasho Soomaali, waaxda luqadaha, qaybta kaalmada augusto amaya . So [facility] [phone].    ATENCIÓN: Si habla español, tiene a whelan disposición servicios gratuitos de asistencia lingüística. Llame al [phone].    We comply with applicable federal civil rights laws and [place] laws. We do not discriminate on the " basis of race, color, national origin, age, disability, sex, sexual orientation, or gender identity.            Thank you!     Thank you for choosing [facility]  for your care. Our goal is always to provide you with excellent care. Hearing back from our patients is one way we can continue to improve our services. Please take a few minutes to complete the written survey that you may receive in the mail after your visit with us. Thank you!             Your Updated Medication List - Protect others around you: Learn how to safely use, store and throw away your medicines at www.disposemymeds.org.          This list is accurate as of 2/14/18  9:41 AM.  Always use your most recent med list.                   Brand Name Dispense Instructions for use Diagnosis    amLODIPine 5 MG tablet    NORVASC    90 tablet    Take 1 tablet (5 mg) by mouth daily    Hypertension goal BP (blood pressure) < 140/90       aspirin 81 MG tablet      Take 1 tablet by mouth daily.        atorvastatin 80 MG tablet    LIPITOR    90 tablet    Take 1 tablet (80 mg) by mouth daily    Dyslipidemia       losartan-hydrochlorothiazide 50-12.5 MG per tablet    HYZAAR    90 tablet    Take 1 tablet by mouth daily    Hypertension goal BP (blood pressure) < 140/90       Multi-vitamin Tabs tablet   Generic drug:  multivitamin, therapeutic with minerals      1 TABLET DAILY

## 2018-02-14 NOTE — LETTER
2018         RE: Jcarlos Alvarez  [address] [facility] [address]        Dear Colleague,    Thank you for referring your patient, Jcarlos Alvarez, to the [facility] [place]. Please see a copy of my visit note below.    Jcarlos Alvarez  :  1950  DOS: 2018  MRN: [id number]    Sports Medicine Clinic Visit    PCP: Fuentes Sims    Jcarlos Alvarez is a 67 year old male who is seen in follow-up from ZENAIDA Kirkpatrick presenting with chronic radiating low back pain.    Injury: Gradual onset of chronic low back pain over the last ~ 4 years, that has been mostly left sided.  Notes radiating right sided sx over the last ~ 2 - 3 months.  Pain located over mild right lower lumbar spine, radiating to right LE.  Reports intermittent radiating pain to right gastroc and numbness in plantar foot.  Additional Features:  Positive: numbness.  Symptoms are better with Rest and elliptical.  Symptoms are worse with: repetitive lifting, bending at waist, prolonged standing.  Other evaluation and/or treatments so far consists of: Ibuprofen and Rest.  Recent imaging completed: MRI completed  @ [facility].  Prior History of related problems: Patient was last treated for this condition in 2017.  Left L5-S1 transforaminal epidural steroid injection completed at [facility] on 17 provided excellent relief of left sided sx.    Social History: works in construction      Review of Systems  Musculoskeletal: as above  Remainder of review of systems is negative including constitutional, CV, pulmonary, GI, Skin and Neurologic except as noted in HPI or medical history.    Past Medical History:   Diagnosis Date     CAD (coronary artery disease)      Cervicalgia      Diverticula of colon 2009    Diverticulosis sigmoid colon.     Hypertension      Old bucket handle tear of medial meniscus     right     Other affections of shoulder region, not elsewhere classified      Past Surgical History:  "  Procedure Laterality Date     C EXCIS KNEE CARTILAGE,MEDIAL OR LAT      Right     TONSILLECTOMY         Objective  BP (!) 154/91  Ht 5' 9.25\" (1.759 m)  Wt 204 lb (92.5 kg)  BMI 29.91 kg/m2    General: healthy, alert and in no distress    HEENT: no scleral icterus or conjunctival erythema   Skin: no suspicious lesions or rash. No jaundice.   CV: regular rhythm by palpation, 2+ distal pulses, no pedal edema    Resp: normal respiratory effort without conversational dyspnea   Psych: normal mood and affect    Gait: nonantalgic, appropriate coordination and balance   Neuro: normal light touch sensory exam of the extremities. Motor strength as noted below     Low back exam:    Inspection:       no visible deformity in the low back       normal skin       normal vascular       normal lymphatic    ROM:       full flexion       full extension    Tender:       paraspinal muscles mild R>L lower lumbar paraspinals    Non Tender:       remainder of lumbar spine       midline       SI joints    Strength:       hip flexion 5/5       knee extension 5/5       ankle dorsiflexion 5/5       ankle plantarflexion 5/5       dorsiflexion of the great toe 5/5    Reflexes:       patellar (L3, L4) symmetric normal       achilles tendons (S1) symmetric normal    Sensation:      grossly intact throughout lower extremities    Skin:       well perfused       capillary refill brisk    Special tests:       straight leg raise left neg        straight leg raise right neg       neg (-) SYED        slump test neg         Neg FADIR        Radiology:  Prior lumbar imaging and report reviewed on [facility]    Assessment:  1. Chronic bilateral low back pain with right-sided sciatica    2. DDD (degenerative disc disease), lumbar        Plan:  Discussed the assessment with the patient.  Follow up: mild grade I degenerative spondylolisthesis at L4-5 creating recess stenosis L>R on last imaging  Prior L5 TFESI on Left in 2017 helped with most L sided sx  Still " has sx on left but inconsistent and milder  More consistent with similar distribution on R at this time  Update MR given last was in 2015  Consider repeat PRAVIN in future, though no current red flags  Encouraged him to continue PT and wt loss goals, which are helping immensely with daily sx  Offered PT in future prn  Discussed silver sneakers as well  Home handouts provided and supportive care reviewed  All questions were answered today  Contact us with additional questions or concerns  Signs and sx of concern reviewed      Tre Wilson DO, CADORINA  Primary Care Sports Medicine  [facility]             Disclaimer: This note consists of symbols derived from keyboarding, dictation and/or voice recognition software. As a result, there may be errors in the script that have gone undetected. Please consider this when interpreting information found in this chart.      Again, thank you for allowing me to participate in the care of your patient.        Sincerely,        Tre Wilson DO

## 2018-03-17 ENCOUNTER — MYC MEDICAL ADVICE (OUTPATIENT)
Dept: ORTHOPEDICS | Facility: CLINIC | Age: 68
End: 2018-03-17

## 2018-03-17 DIAGNOSIS — G89.29 CHRONIC BILATERAL LOW BACK PAIN WITH SCIATICA, SCIATICA LATERALITY UNSPECIFIED: Primary | ICD-10-CM

## 2018-03-17 DIAGNOSIS — M54.40 CHRONIC BILATERAL LOW BACK PAIN WITH SCIATICA, SCIATICA LATERALITY UNSPECIFIED: Primary | ICD-10-CM

## 2018-03-17 NOTE — TELEPHONE ENCOUNTER
EXAM: MR LUMBAR SPINE WITHOUT CONTRAST 1.5T    CLINICAL INFORMATION: 67-year-old male with back and right leg pain for 4-5 years.    TECHNICAL INFORMATION: T1, T2, and STIR sagittal, coronal T2 and T1 and T2 axial sections at selected levels.    COMPARISON/CORRELATION: MRI lumbar spine 9/11/2015.    INTERPRETATION:    Segmentation: 5 lumbar-type vertebral bodies in lordotic alignment.    Conus: The distal cord and conus demonstrates normal signal, terminating at T12-L1.    Osseous Structures: No acute fracture or osseous destructive lesion. No spondylolysis. Normal marrow signal intensity.    L5-S1: Moderate disc degeneration, retrolisthesis and mild-moderate right greater than left facet degeneration. No central or subarticular stenosis. Patent foramina.    L4-5: Mild disc degeneration, grade 1 (5 mm) degenerative spondylolisthesis with moderate to advanced hypertrophic facet degeneration and ligamentum flavum thickening contributing to moderate to severe central and severe subarticular stenosis with descending L5 neural impingement. Right greater than left facet and periarticular edema. Mild/moderate bilateral foraminal stenosis.    L3-4: Normal disc morphology and signal. Mild facet hypertrophy. Mild subarticular stenosis without neural impingement. Patent foramina.    L2-3: Mild disc degeneration and mild hypertrophic facet degeneration with ligamentum flavum thickening. Mild right foraminal stenosis.    L1-2 and T12-L1: Normal dorsal disc contour and no stenosis or impingement.    Paraspinal: No soft tissue abnormality.    Abdominal/Pelvic Structures: Irregular appearance of the bladder wall is unchanged with some bladder diverticula. Mild renal cortical atrophy.    CONCLUSION: Multilevel lumbar spondylosis in lordotic alignment and the following specific findings:    1. Progression of L4-5 grade 1 degenerative spondylolisthesis with increased central and subarticular stenosis and descending neural impingement.  Unchanged component of right greater than left facet inflammatory arthropathy.    2. Unchanged disc and facet degeneration at L5-S1 and L2-3.    MWJ        Electronically signed on 2/23/2018 3:39:00 PM by Karen Carter M.D.

## 2018-03-17 NOTE — TELEPHONE ENCOUNTER
Discussed results with Riki.  Will try another injection through [facility].  Please order as a bilateral L5-S1 TFESI, vs ILESI at L5-S1, at discretion of provider.  All questions answered

## 2018-03-30 ENCOUNTER — TRANSFERRED RECORDS (OUTPATIENT)
Dept: HEALTH INFORMATION MANAGEMENT | Facility: CLINIC | Age: 68
End: 2018-03-30

## 2018-04-03 DIAGNOSIS — E78.5 DYSLIPIDEMIA: ICD-10-CM

## 2018-04-03 RX ORDER — ATORVASTATIN CALCIUM 80 MG/1
TABLET, FILM COATED ORAL
Qty: 90 TABLET | Refills: 2 | Status: SHIPPED | OUTPATIENT
Start: 2018-04-03 | End: 2019-02-12

## 2018-04-11 ENCOUNTER — TRANSFERRED RECORDS (OUTPATIENT)
Dept: HEALTH INFORMATION MANAGEMENT | Facility: CLINIC | Age: 68
End: 2018-04-11

## 2018-04-19 NOTE — TELEPHONE ENCOUNTER
Reviewed patient's chart.  It appears that right transforaminal epidural steroid injection completed on 3/30 provided good relief of right sided radiating low back pain.  He returned to [facility] d/t left-sided sx not improving.  transforaminal epidural steroid injection of left L5-S1 completed on 4/11.  Updated order completed as repeat left L5-S1 had been previously discussed.      Copy order faxed to [facility] as requested.  They may contact clinic if needing further information.    Dean Puentes, ATC

## 2018-04-19 NOTE — TELEPHONE ENCOUNTER
[facility] LVM. Patient received injection on 4/11 and insurance has denied coverage because the order did not have symptoms and diagnosis listed on it. Would like a new order with patients symptoms and diagnosis so they can resubmit to insurance. Please fax new order to [phone].

## 2018-07-03 ENCOUNTER — OFFICE VISIT (OUTPATIENT)
Dept: ORTHOPEDICS | Facility: CLINIC | Age: 68
End: 2018-07-03
Payer: COMMERCIAL

## 2018-07-03 VITALS
WEIGHT: 211 LBS | SYSTOLIC BLOOD PRESSURE: 136 MMHG | HEIGHT: 69 IN | DIASTOLIC BLOOD PRESSURE: 68 MMHG | BODY MASS INDEX: 31.25 KG/M2

## 2018-07-03 DIAGNOSIS — M76.61 ACHILLES TENDINITIS OF RIGHT LOWER EXTREMITY: Primary | ICD-10-CM

## 2018-07-03 PROCEDURE — 99213 OFFICE O/P EST LOW 20 MIN: CPT | Performed by: PEDIATRICS

## 2018-07-03 NOTE — NURSING NOTE
Patient was given home exercise program at the direction of JON CRSOS  that included the following exercise(s) :    Exercise(s) calf stretching, plantar massage     Repititions: hold 15 sec    Times per day: 3    Patient demonstrated understanding of and the ability to perform these exercises. Patient was seen for 3 minutes to provide this home exercise program. Patient was directed to discontinue any exercises that cause pain, and to call the clinic if any questions.

## 2018-07-03 NOTE — PROGRESS NOTES
Sports Medicine Clinic Visit    PCP: Fuentes Sims    Jcarlos Alvarez is a 67 year old male who is seen  as a self referral presenting with right achilles tendon pain.  Pain has been present for about 3 weeks.  He was walking quite a bit for work and this seemed to aggrivate it.  Does have a history of plantar fascitis, and does have inserts he uses when this is bothersome, he has recently started to use them.  Woke up today with no pain.      **  Patient has inserts for plantar fasciitis.     **  Patient has a constant burning sensation in the bottom of the right foot. He has been treated by Dr. Wilson for bilateral low back pain with sciatica. He had a corticosteroid injection in the low back that initially gave relief to bilateral foot burning, but the right sided foot burning has returned.     Injury: overuse    Location of Pain: right Achilles tendon   Duration of Pain: 3 week(s)  Rating of Pain at worst: 6/10  Rating of Pain Currently: 1/10  Symptoms are better with: Ibuprofen, Rest and shoe inserts   Symptoms are worse with: walking   Additional Features:   Positive:    Negative: swelling, bruising, popping, grinding, catching, locking, instability, paresthesias, numbness, weakness, pain in other joints and systemic symptoms  Other evaluation and/or treatments so far consists of: inserts   Prior History of related problems: HX of plantar fascitis     Social History:      Review of Systems  Musculoskeletal: as above  Remainder of review of systems is negative including constitutional, CV, pulmonary, GI, Skin and Neurologic except as noted in HPI or medical history.    This document serves as a record of the services and decisions personally performed and made by Tre Martin DO, CAQ. It was created on his behalf by Jcarlos De La Cruz, a trained medical scribe. The creation of this document is based the provider's statements to the medical scribe.  Jcarlos De La Cruz July 3, 2018, 10:37 AM   "    Past Medical History:   Diagnosis Date     CAD (coronary artery disease)      Cervicalgia      Diverticula of colon 2009    Diverticulosis sigmoid colon.     Hypertension      Old bucket handle tear of medial meniscus     right     Other affections of shoulder region, not elsewhere classified      Past Surgical History:   Procedure Laterality Date     C EXCIS KNEE CARTILAGE,MEDIAL OR LAT      Right     TONSILLECTOMY       Family History   Problem Relation Age of Onset     C.A.YAZMIN Mother      stent age 75     Hypertension Mother      Asthma Paternal Grandfather      Diabetes Paternal Grandfather      Cardiovascular Brother      C.A.D. Brother       MI at age 49     Hypertension Brother      Hypertension Sister      Cardiovascular Brother      C.A.ABRAHAM. Brother      ASCVD age 55     Cancer - colorectal No family hx of      Prostate Cancer No family hx of      Social History     Social History     Marital status:      Spouse name: N/A     Number of children: N/A     Years of education: N/A     Occupational History     Not on file.     Social History Main Topics     Smoking status: Never Smoker     Smokeless tobacco: Never Used     Alcohol use Yes      Comment: Occassionally     Drug use: No     Sexual activity: Yes     Partners: Female     Other Topics Concern     Parent/Sibling W/ Cabg, Mi Or Angioplasty Before 65f 55m? Yes     Social History Narrative       Objective  /68  Ht 5' 9.25\" (1.759 m)  Wt 211 lb (95.7 kg)  BMI 30.93 kg/m2    GENERAL APPEARANCE: healthy, alert and no distress   GAIT: NORMAL  SKIN: no suspicious lesions or rashes  NEURO: Normal strength and tone, mentation intact and speech normal  PSYCH:  mentation appears normal and affect normal/bright  HEENT: no scleral icterus  CV: no extremity edema   RESP: nonlabored breathing    Right Ankle Exam:    Inspection:       no visible ecchymosis       no visible edema or effusion    Foot inspection:       no deformity noted    ROM:        " full ROM with dorsiflexion, plantarflexion, inversion and eversion  Mild pain with full active DF     Strength:      ankle dorsiflexion 5/5       plantarflexion 5/5       inversion 5/5       eversion 5/5        Mild pain with resisted PF    Non-Tender:       Throughout the foot and ankle area     Skin:       well perfused       capillary refill less than 2 seconds    Sensation:  Grossly intact to light touch    Fair test normal    Radiology:  None today.      Assessment:  1. Achilles tendinitis of right lower extremity        Plan:  Discussed the assessment with the patient. Doing well at this point, symptoms improved by history.    We discussed the following: symptom treatment, activity modification/rest, rehab and support for the affected area. Following discussion, plan:   Topical Treatments: The patient is advised to apply ice or cold packs intermittently as needed to relieve pain.   Over the counter medication: Patient's preferred OTC medication as directed on packaging.  Activity Modification: as discussed   Rehab: Home Exercise Program provided in clinic; he may call if a PT referral is desired   Medical Equipment: patient as custom orthotics, advise continued use for now  Follow up: as needed   Questions answered. The patient indicates understanding of these issues and agrees with the plan.     **  He also had some questions about residual symptoms from his prior low back issues.  In particular, his low back pain and radiating pain have improved, but he has some residual right plantar foot numbness and tingling.  No functional limitations per patient. We discussed continued clinical monitoring for now, and he can recheck if there are any additional concerns.    Tre Martin, DO, CAQ       Disclaimer: This note consists of symbols derived from keyboarding, dictation and/or voice recognition software. As a result, there may be errors in the script that have gone undetected. Please consider this when  interpreting information found in this chart.    The information in this document, created by the medical scribe for me, accurately reflects the services I personally performed and the decisions made by me. I have reviewed and approved this document for accuracy.   Tre Martin DO, CAQ

## 2018-07-03 NOTE — MR AVS SNAPSHOT
After Visit Summary   7/3/2018    Jcarlos Alvarez    MRN: [id number]           Patient Information     Date Of Birth          1950        Visit Information        Provider Department      7/3/2018 10:20 AM Tre Martin,  [facility]        Today's Diagnoses     Achilles tendinitis of right lower extremity    -  1      Care Instructions    Start home exercise program     Continue use of orthotics     Call if a referal to physical therapy is desired           Follow-ups after your visit        Follow-up notes from your care team     Return if symptoms worsen or fail to improve.      Who to contact     If you have questions or need follow up information about today's clinic visit or your schedule please contact [facility] directly at [phone].  Normal or non-critical lab and imaging results will be communicated to you by [email or web address], letter or phone within 4 business days after the clinic has received the results. If you do not hear from us within 7 days, please contact the clinic through [email or web address] or phone. If you have a critical or abnormal lab result, we will notify you by phone as soon as possible.  Submit refill requests through Cumulux or call your pharmacy and they will forward the refill request to us. Please allow 3 business days for your refill to be completed.          Additional Information About Your Visit        MyChart Information     Cumulux gives you secure access to your electronic health record. If you see a primary care provider, you can also send messages to your care team and make appointments. If you have questions, please call your primary care clinic.  If you do not have a primary care provider, please call [phone] and they will assist you.        Care EveryWhere ID     This is your Care EveryWhere ID. This could be used by other organizations to access your [facility] medical records  [id number]    "     Your Vitals Were     Height BMI (Body Mass Index)                5' 9.25\" (1.759 m) 30.93 kg/m2           Blood Pressure from Last 3 Encounters:   07/03/18 136/68   02/14/18 (!) 154/91   01/08/18 130/70    Weight from Last 3 Encounters:   07/03/18 211 lb (95.7 kg)   02/14/18 204 lb (92.5 kg)   01/08/18 207 lb (93.9 kg)              Today, you had the following     No orders found for display       Primary Care Provider Office Phone # Fax #    Fuentes Sims [phone] [address] [facility] [address]        Equal Access to Services     ABAD INTERIANO : Hadii gordy saldanao Sovi, waaxda luqadaha, qaybta kaalmada adeegyada, augusto guevara . So [facility] [phone].    ATENCIÓN: Si habla español, tiene a whelan disposición servicios gratuitos de asistencia lingüística. [facility] [phone].    We comply with applicable federal civil rights laws and [place] laws. We do not discriminate on the basis of race, color, national origin, age, disability, sex, sexual orientation, or gender identity.            Thank you!     Thank you for choosing [facility]  for your care. Our goal is always to provide you with excellent care. Hearing back from our patients is one way we can continue to improve our services. Please take a few minutes to complete the written survey that you may receive in the mail after your visit with us. Thank you!             Your Updated Medication List - Protect others around you: Learn how to safely use, store and throw away your medicines at www.disposemymeds.org.          This list is accurate as of 7/3/18 11:21 AM.  Always use your most recent med list.                   Brand Name Dispense Instructions for use Diagnosis    amLODIPine 5 MG tablet    NORVASC    90 tablet    Take 1 tablet (5 mg) by mouth daily    Hypertension goal BP (blood pressure) < 140/90       aspirin 81 MG tablet      Take 1 tablet by mouth " daily.        * atorvastatin 80 MG tablet    LIPITOR    90 tablet    Take 1 tablet (80 mg) by mouth daily    Dyslipidemia       * atorvastatin 80 MG tablet    LIPITOR    90 tablet    TAKE ONE TABLET BY MOUTH DAILY    Dyslipidemia       losartan-hydrochlorothiazide 50-12.5 MG per tablet    HYZAAR    90 tablet    Take 1 tablet by mouth daily    Hypertension goal BP (blood pressure) < 140/90       Multi-vitamin Tabs tablet   Generic drug:  multivitamin, therapeutic with minerals      1 TABLET DAILY        * Notice:  This list has 2 medication(s) that are the same as other medications prescribed for you. Read the directions carefully, and ask your doctor or other care provider to review them with you.

## 2018-07-03 NOTE — LETTER
7/3/2018         RE: Jcarlos Alvarez  [address] [facility] [address]        Dear Colleague,    Thank you for referring your patient, Jcarlos Alvarez, to the [facility]. Please see a copy of my visit note below.    Sports Medicine Clinic Visit    PCP: Fuentes Sims    Jcarlos Alvarez is a 67 year old male who is seen  as a self referral presenting with right achilles tendon pain.  Pain has been present for about 3 weeks.  He was walking quite a bit for work and this seemed to aggrivate it.  Does have a history of plantar fascitis, and does have inserts he uses when this is bothersome, he has recently started to use them.  Woke up today with no pain.      **  Patient has inserts for plantar fasciitis.     **  Patient has a constant burning sensation in the bottom of the right foot. He has been treated by Dr. Wilson for bilateral low back pain with sciatica. He had a corticosteroid injection in the low back that initially gave relief to bilateral foot burning, but the right sided foot burning has returned.     Injury: overuse    Location of Pain: right Achilles tendon   Duration of Pain: 3 week(s)  Rating of Pain at worst: 6/10  Rating of Pain Currently: 1/10  Symptoms are better with: Ibuprofen, Rest and shoe inserts   Symptoms are worse with: walking   Additional Features:   Positive:    Negative: swelling, bruising, popping, grinding, catching, locking, instability, paresthesias, numbness, weakness, pain in other joints and systemic symptoms  Other evaluation and/or treatments so far consists of: inserts   Prior History of related problems: HX of plantar fascitis     Social History:      Review of Systems  Musculoskeletal: as above  Remainder of review of systems is negative including constitutional, CV, pulmonary, GI, Skin and Neurologic except as noted in HPI or medical history.    This document serves as a record of the services and decisions personally  "performed and made by Tre Martin DO, CAQ. It was created on his behalf by Jcarlos De La Cruz, a trained medical scribe. The creation of this document is based the provider's statements to the medical scribe.  Jcarlos De La Cruz July 3, 2018, 10:37 AM      Past Medical History:   Diagnosis Date     CAD (coronary artery disease)      Cervicalgia      Diverticula of colon 2009    Diverticulosis sigmoid colon.     Hypertension      Old bucket handle tear of medial meniscus     right     Other affections of shoulder region, not elsewhere classified      Past Surgical History:   Procedure Laterality Date     C EXCIS KNEE CARTILAGE,MEDIAL OR LAT      Right     TONSILLECTOMY       Family History   Problem Relation Age of Onset     C.A.D. Mother      stent age 75     Hypertension Mother      Asthma Paternal Grandfather      Diabetes Paternal Grandfather      Cardiovascular Brother      C.A.D. Brother       MI at age 49     Hypertension Brother      Hypertension Sister      Cardiovascular Brother      C.A.D. Brother      ASCVD age 55     Cancer - colorectal No family hx of      Prostate Cancer No family hx of      Social History     Social History     Marital status:      Spouse name: N/A     Number of children: N/A     Years of education: N/A     Occupational History     Not on file.     Social History Main Topics     Smoking status: Never Smoker     Smokeless tobacco: Never Used     Alcohol use Yes      Comment: Occassionally     Drug use: No     Sexual activity: Yes     Partners: Female     Other Topics Concern     Parent/Sibling W/ Cabg, Mi Or Angioplasty Before 65f 55m? Yes     Social History Narrative       Objective  /68  Ht 5' 9.25\" (1.759 m)  Wt 211 lb (95.7 kg)  BMI 30.93 kg/m2    GENERAL APPEARANCE: healthy, alert and no distress   GAIT: NORMAL  SKIN: no suspicious lesions or rashes  NEURO: Normal strength and tone, mentation intact and speech normal  PSYCH:  mentation appears normal and affect " normal/bright  HEENT: no scleral icterus  CV: no extremity edema   RESP: nonlabored breathing    Right Ankle Exam:    Inspection:       no visible ecchymosis       no visible edema or effusion    Foot inspection:       no deformity noted    ROM:        full ROM with dorsiflexion, plantarflexion, inversion and eversion  Mild pain with full active DF     Strength:      ankle dorsiflexion 5/5       plantarflexion 5/5       inversion 5/5       eversion 5/5        Mild pain with resisted PF    Non-Tender:       Throughout the foot and ankle area     Skin:       well perfused       capillary refill less than 2 seconds    Sensation:  Grossly intact to light touch    Fair test normal    Radiology:  None today.      Assessment:  1. Achilles tendinitis of right lower extremity        Plan:  Discussed the assessment with the patient. Doing well at this point, symptoms improved by history.    We discussed the following: symptom treatment, activity modification/rest, rehab and support for the affected area. Following discussion, plan:   Topical Treatments: The patient is advised to apply ice or cold packs intermittently as needed to relieve pain.   Over the counter medication: Patient's preferred OTC medication as directed on packaging.  Activity Modification: as discussed   Rehab: Home Exercise Program provided in clinic; he may call if a PT referral is desired   Medical Equipment: patient as custom orthotics, advise continued use for now  Follow up: as needed   Questions answered. The patient indicates understanding of these issues and agrees with the plan.     **  He also had some questions about residual symptoms from his prior low back issues.  In particular, his low back pain and radiating pain have improved, but he has some residual right plantar foot numbness and tingling.  No functional limitations per patient. We discussed continued clinical monitoring for now, and he can recheck if there are any additional  concerns.    Tre Martin DO, CAQ       Disclaimer: This note consists of symbols derived from keyboarding, dictation and/or voice recognition software. As a result, there may be errors in the script that have gone undetected. Please consider this when interpreting information found in this chart.    The information in this document, created by the medical scribe for me, accurately reflects the services I personally performed and the decisions made by me. I have reviewed and approved this document for accuracy.   Tre Martin DO, CAQ     Again, thank you for allowing me to participate in the care of your patient.        Sincerely,        Tre Martin DO

## 2018-07-03 NOTE — PATIENT INSTRUCTIONS
Start home exercise program     Continue use of orthotics     Call if a referal to physical therapy is desired

## 2018-11-19 ENCOUNTER — MYC MEDICAL ADVICE (OUTPATIENT)
Dept: ORTHOPEDICS | Facility: CLINIC | Age: 68
End: 2018-11-19

## 2018-11-19 NOTE — TELEPHONE ENCOUNTER
Reviewed patient's chart.    Lumbar left L5-S1 transforaminal epidural steroid injection completed on 4/11/18, right L5-S1 transforaminal epidural steroid injection completed 3/30/18.      Dean Puentes ATC

## 2018-12-10 ENCOUNTER — TRANSFERRED RECORDS (OUTPATIENT)
Dept: HEALTH INFORMATION MANAGEMENT | Facility: CLINIC | Age: 68
End: 2018-12-10

## 2018-12-21 ENCOUNTER — TRANSFERRED RECORDS (OUTPATIENT)
Dept: HEALTH INFORMATION MANAGEMENT | Facility: CLINIC | Age: 68
End: 2018-12-21

## 2019-01-03 ENCOUNTER — THERAPY VISIT (OUTPATIENT)
Dept: PHYSICAL THERAPY | Facility: CLINIC | Age: 69
End: 2019-01-03
Payer: MEDICARE

## 2019-01-03 DIAGNOSIS — M54.12 CERVICAL RADICULOPATHY: ICD-10-CM

## 2019-01-03 PROCEDURE — 97110 THERAPEUTIC EXERCISES: CPT | Mod: GP | Performed by: PHYSICAL THERAPIST

## 2019-01-03 PROCEDURE — 97161 PT EVAL LOW COMPLEX 20 MIN: CPT | Mod: GP | Performed by: PHYSICAL THERAPIST

## 2019-01-03 PROCEDURE — 97140 MANUAL THERAPY 1/> REGIONS: CPT | Mod: GP | Performed by: PHYSICAL THERAPIST

## 2019-01-03 NOTE — PROGRESS NOTES
[facility] Initial Evaluation  Subjective:  The history is provided by the patient. No  was used.   Jcarlos Alvarez is a 68 year old male with a left shoulder condition.  Condition occurred with:  Unknown cause.  Condition occurred: for unknown reasons.  This is a recurrent condition  Had a similar issue over a year ago that PT helped resolve.  A month and a half ago pain came back with similar sxs without a specific reason.  Pt was referred to PT on 1/3/19.    Patient reports pain:  Posterior.  Radiates to:  Cervical and upper arm.  Pain is described as aching and shooting and is intermittent and reported as 4/10.  Associated symptoms:  Loss of motion/stiffness and tingling. Pain is worse during the night.  Symptoms are exacerbated by certain positions, lying on extremity and using arm overhead and relieved by nothing.  Since onset symptoms are gradually worsening.        General health as reported by patient is good.  Pertinent medical history includes:  High blood pressure.  Medical allergies: no.  Other surgeries include:  None reported.  Current medications:  High blood pressure medication.  Current occupation is .  Patient is working in normal job without restrictions.  Primary job tasks include:  Prolonged standing and repetitive tasks.    Barriers include:  None as reported by the patient.    Red flags:  None as reported by the patient.                        Objective:  Standing Alignment:    Cervical/Thoracic:  Forward head and thoracic kyphosis increased  Shoulder/UE:  Rounded shoulders                  Flexibility/Screens:   Positive screens:  CervicalNegative screens: Shoulder                         Cervical/Thoracic Evaluation  Arom wnl cervical: retracted: repeated in sitting peripheralized, repeated in supine initially peripheralized but then abolished sxs.     AROM:  AROM Cervical:    Flexion:        WNL (-)  Extension:       Peripheralized,  moderate loss  Rotation:         Left:     Right:  Side Bend:      Left:     Right:         Cervical Myotomes:  normal                              Spinal Segmental Conclusions:    Level:  Hypo at C6, C7, T1, T2, T3 and T4                                                General     ROS    Assessment/Plan:    Patient is a 68 year old male with cervical complaints.    Patient has the following significant findings with corresponding treatment plan.                Diagnosis 1:  Cervical radiculopathy  Pain -  manual therapy, self management, education and home program  Decreased ROM/flexibility - manual therapy, therapeutic exercise and home program  Decreased joint mobility - manual therapy, therapeutic exercise and home program  Impaired posture - neuro re-education and home program    Therapy Evaluation Codes:   1) History comprised of:   Personal factors that impact the plan of care:      Past/current experiences.    Comorbidity factors that impact the plan of care are:      None.     Medications impacting care: None.  2) Examination of Body Systems comprised of:   Body structures and functions that impact the plan of care:      Cervical spine and Shoulder.   Activity limitations that impact the plan of care are:      Working, Sleeping and Laying down.  3) Clinical presentation characteristics are:   Stable/Uncomplicated.  4) Decision-Making    Low complexity using standardized patient assessment instrument and/or measureable assessment of functional outcome.  Cumulative Therapy Evaluation is: Low complexity.    Previous and current functional limitations:  (See Goal Flow Sheet for this information)    Short term and Long term goals: (See Goal Flow Sheet for this information)     Communication ability:  Patient appears to be able to clearly communicate and understand verbal and written communication and follow directions correctly.  Treatment Explanation - The following has been discussed with the patient:   RX  ordered/plan of care  Anticipated outcomes  Possible risks and side effects  This patient would benefit from PT intervention to resume normal activities.   Rehab potential is good.    Frequency:  1 X week, once daily  Duration:  for 4 weeks  Discharge Plan:  Achieve all LTG.  Independent in home treatment program.  Reach maximal therapeutic benefit.    Please refer to the daily flowsheet for treatment today, total treatment time and time spent performing 1:1 timed codes.

## 2019-01-03 NOTE — LETTER
DEPARTMENT OF HEALTH AND HUMAN SERVICES  CENTERS FOR MEDICARE & MEDICAID SERVICES    PLAN/UPDATED PLAN OF PROGRESS FOR OUTPATIENT REHABILITATION    PATIENTS NAME:  Jcarlos Alvarez   : 1950  PROVIDER NUMBER:    [id number]  HICN:  [id number]  PROVIDER NAME: [place] [facility]  MEDICAL RECORD NUMBER: [id number]   START OF CARE DATE:  SOC Date: 19   TYPE:  PT  PRIMARY/TREATMENT DIAGNOSIS: (Pertinent Medical Diagnosis)  Cervical radiculopathy  VISITS FROM START OF CARE:  Rxs Used: 1     [facility] Initial Evaluation  Subjective:  The history is provided by the patient. No  was used.   Jcarlos Alvarez is a 68 year old male with a left shoulder condition.  Condition occurred with:  Unknown cause.  Condition occurred: for unknown reasons.  This is a recurrent condition  Had a similar issue over a year ago that PT helped resolve.  A month and a half ago pain came back with similar sxs without a specific reason.  Pt was referred to PT on 1/3/19.Patient reports pain:  Posterior.  Radiates to:  Cervical and upper arm.  Pain is described as aching and shooting and is intermittent and reported as 4/10.  Associated symptoms:  Loss of motion/stiffness and tingling. Pain is worse during the night.  Symptoms are exacerbated by certain positions, lying on extremity and using arm overhead and relieved by nothing.  Since onset symptoms are gradually worsening. General health as reported by patient is good.  Pertinent medical history includes:  High blood pressure.  Medical allergies: no.  Other surgeries include:  None reported.  Current medications:  High blood pressure medication.  Current occupation is .  Patient is working in normal job without restrictions.  Primary job tasks include:  Prolonged standing and repetitive tasks.  Barriers include:  None as reported by the patient.  Red flags:  None as reported by the patient.         Objective:  Standing Alignment:    Cervical/Thoracic:  Forward head and thoracic kyphosis increased  Shoulder/UE:  Rounded shoulders  Flexibility/Screens:   Positive screens:  CervicalNegative screens: Shoulder   Cervical/Thoracic Evaluation  Arom wnl cervical: retracted: repeated in sitting peripheralized, repeated in supine initially peripheralized but then abolished sxs.     AROM:  AROM Cervical:  Flexion:        WNL (-)  Extension:       Peripheralized, moderate loss  Rotation:         Left:     Right:  Side Bend:      Left:     Right:   Cervical Myotomes:  normal  Spinal Segmental Conclusions:    Level:  Hypo at C6, C7, T1, T2, T3 and T4S    Assessment/Plan:    Patient is a 68 year old male with cervical complaints.    Patient has the following significant findings with corresponding treatment plan.                Diagnosis 1:  Cervical radiculopathy  Pain -  manual therapy, self management, education and home program  Decreased ROM/flexibility - manual therapy, therapeutic exercise and home program  Decreased joint mobility - manual therapy, therapeutic exercise and home program  Impaired posture - neuro re-education and home program    Therapy Evaluation Codes:   1) History comprised of:   Personal factors that impact the plan of care:      Past/current experiences.    Comorbidity factors that impact the plan of care are:      None.     Medications impacting care: None.  2) Examination of Body Systems comprised of:   Body structures and functions that impact the plan of care:      Cervical spine and Shoulder.   Activity limitations that impact the plan of care are:      Working, Sleeping and Laying down.  3) Clinical presentation characteristics are:   Stable/Uncomplicated.  4) Decision-Making    Low complexity using standardized patient assessment instrument and/or measureable assessment of functional outcome.  Cumulative Therapy Evaluation is: Low complexity.  Previous and current functional  "limitations:  (See Goal Flow Sheet for this information)    Short term and Long term goals: (See Goal Flow Sheet for this information)   Communication ability:  Patient appears to be able to clearly communicate and understand verbal and written communication and follow directions correctly.  Treatment Explanation - The following has been discussed with the patient:   RX ordered/plan of care  Anticipated outcomes  Possible risks and side effects  This patient would benefit from PT intervention to resume normal activities.   Rehab potential is good.  Frequency:  1 X week, once daily  Duration:  for 4 weeks  Discharge Plan:  Achieve all LTG.  Independent in home treatment program.  Reach maximal therapeutic benefit.  Please refer to the daily flowsheet for treatment today, total treatment time and time spent performing 1:1 timed codes.                   PATIENTS NAME:  Jcarlos Alvarez   : 1950      Caregiver Signature/Credentials _____________________________ Date ________       Treating Provider: Corey Hernandez, PT   I have reviewed and certified the need for these services and plan of treatment while under my care.        PHYSICIAN'S SIGNATURE:   _________________________________________  Date___________   Tre Wilson DO    Certification period:  Beginning of Cert date period: 19 to  End of Cert period date: 19     Functional Level Progress Report: Please see attached \"Goal Flow sheet for Functional level.\"    ____X____ Continue Services or       ________ DC Services                Service dates: From  SOC Date: 19 date to present                         "

## 2019-01-11 ENCOUNTER — THERAPY VISIT (OUTPATIENT)
Dept: PHYSICAL THERAPY | Facility: CLINIC | Age: 69
End: 2019-01-11
Payer: MEDICARE

## 2019-01-11 DIAGNOSIS — M54.12 CERVICAL RADICULOPATHY: ICD-10-CM

## 2019-01-11 DIAGNOSIS — G89.29 CHRONIC LEFT SHOULDER PAIN: ICD-10-CM

## 2019-01-11 DIAGNOSIS — M25.512 CHRONIC LEFT SHOULDER PAIN: ICD-10-CM

## 2019-01-11 PROCEDURE — 97140 MANUAL THERAPY 1/> REGIONS: CPT | Mod: GP | Performed by: PHYSICAL THERAPIST

## 2019-01-11 PROCEDURE — 97110 THERAPEUTIC EXERCISES: CPT | Mod: GP | Performed by: PHYSICAL THERAPIST

## 2019-01-11 NOTE — PROGRESS NOTES
Subjective:  HPI                    Objective:  System    Physical Exam    General     ROS    Assessment/Plan:    SUBJECTIVE  Subjective: Improving- using a lot less Ibuprofen.  TInlging frequency is way less too   Current Pain level: 2/10   Changes in function:  Yes (See Goal flowsheet attached for changes in current functional level)     Adverse reaction to treatment or activity:  None    OBJECTIVE  Objective: Pt did not get any tignling with retraction/extension over table this time.  Better postural awarenss     ASSESSMENT  Jcarlos continues to require intervention to meet STG and LTG's: PT  Patient is progressing as expected.  Response to therapy has shown an improvement in  pain level and radicular symptoms  Progress made towards STG/LTG?  Yes (See Goal flowsheet attached for updates on achievement of STG and LTG)    PLAN  Current treatment program is being advanced to more complex exercises.    PTA/ATC plan:  N/A    Please refer to the daily flowsheet for treatment today, total treatment time and time spent performing 1:1 timed codes.

## 2019-01-18 ENCOUNTER — OFFICE VISIT (OUTPATIENT)
Dept: ORTHOPEDICS | Facility: CLINIC | Age: 69
End: 2019-01-18
Payer: MEDICARE

## 2019-01-18 ENCOUNTER — THERAPY VISIT (OUTPATIENT)
Dept: PHYSICAL THERAPY | Facility: CLINIC | Age: 69
End: 2019-01-18
Payer: MEDICARE

## 2019-01-18 VITALS
SYSTOLIC BLOOD PRESSURE: 162 MMHG | WEIGHT: 213 LBS | BODY MASS INDEX: 31.55 KG/M2 | HEIGHT: 69 IN | DIASTOLIC BLOOD PRESSURE: 79 MMHG

## 2019-01-18 DIAGNOSIS — M51.369 DDD (DEGENERATIVE DISC DISEASE), LUMBAR: Primary | ICD-10-CM

## 2019-01-18 DIAGNOSIS — G89.29 CHRONIC BILATERAL LOW BACK PAIN WITH SCIATICA, SCIATICA LATERALITY UNSPECIFIED: ICD-10-CM

## 2019-01-18 DIAGNOSIS — M54.12 CERVICAL RADICULOPATHY: ICD-10-CM

## 2019-01-18 DIAGNOSIS — M54.40 CHRONIC BILATERAL LOW BACK PAIN WITH SCIATICA, SCIATICA LATERALITY UNSPECIFIED: ICD-10-CM

## 2019-01-18 PROCEDURE — 97112 NEUROMUSCULAR REEDUCATION: CPT | Mod: GP | Performed by: PHYSICAL THERAPIST

## 2019-01-18 PROCEDURE — 97110 THERAPEUTIC EXERCISES: CPT | Mod: GP | Performed by: PHYSICAL THERAPIST

## 2019-01-18 PROCEDURE — 99213 OFFICE O/P EST LOW 20 MIN: CPT | Performed by: FAMILY MEDICINE

## 2019-01-18 PROCEDURE — 97140 MANUAL THERAPY 1/> REGIONS: CPT | Mod: GP | Performed by: PHYSICAL THERAPIST

## 2019-01-18 ASSESSMENT — MIFFLIN-ST. JEOR: SCORE: 1725.5

## 2019-01-18 NOTE — PROGRESS NOTES
Jcarlos Alvarez  :  1950  DOS: 19  MRN: [id number]    Sports Medicine Clinic Visit    PCP: Fuentes Sims    Jcarlos Alvarez is a 67 year old male who is seen in follow-up from ZENAIDA Kirkpatrick presenting with chronic radiating low back pain.    Injury: Gradual onset of chronic low back pain over the last ~ 4 years, that has been mostly left sided.  Notes radiating right sided sx over the last ~ 2 - 3 months.  Pain located over mild right lower lumbar spine, radiating to right LE.  Reports intermittent radiating pain to right gastroc and numbness in plantar foot.  Additional Features:  Positive: numbness.  Symptoms are better with Rest and elliptical.  Symptoms are worse with: repetitive lifting, bending at waist, prolonged standing.  Other evaluation and/or treatments so far consists of: Ibuprofen and Rest.  Recent imaging completed: MRI completed  @ [facility].  Prior History of related problems: Patient was last treated for this condition in 2017.  Left L5-S1 transforaminal epidural steroid injection completed at [facility] on 17 provided excellent relief of left sided sx.    Social History: works in construction    Interim History - 2019  Since last visit on 18 patient has mild low back pain.  Patient reports that bilateral L4-L5 transforaminal epidural steroid injection completed on 12/10 (right),  (left) has provided good relief.  Patient would like referral to physical therapy.  No interim injury.       Review of Systems  Musculoskeletal: as above  Remainder of review of systems is negative including constitutional, CV, pulmonary, GI, Skin and Neurologic except as noted in HPI or medical history.    Past Medical History:   Diagnosis Date     CAD (coronary artery disease)      Cervicalgia      Diverticula of colon     Diverticulosis sigmoid colon.     Hypertension      Old bucket handle tear of medial meniscus     right     Other affections of shoulder region, not  "elsewhere classified      Past Surgical History:   Procedure Laterality Date     C EXCIS KNEE CARTILAGE,MEDIAL OR LAT      Right     TONSILLECTOMY         Objective  /79   Ht 1.759 m (5' 9.25\")   Wt 96.6 kg (213 lb)   BMI 31.23 kg/m      General: healthy, alert and in no distress    HEENT: no scleral icterus or conjunctival erythema   Skin: no suspicious lesions or rash. No jaundice.   CV: regular rhythm by palpation, 2+ distal pulses, no pedal edema    Resp: normal respiratory effort without conversational dyspnea   Psych: normal mood and affect    Gait: nonantalgic, appropriate coordination and balance   Neuro: normal light touch sensory exam of the extremities. Motor strength as noted below     Low back exam:    Inspection:       no visible deformity in the low back       normal skin       normal vascular       normal lymphatic    ROM:       full flexion       full extension    Tender:       paraspinal muscles mild R>L lower lumbar paraspinals    Non Tender:       remainder of lumbar spine       midline       SI joints    Strength:       hip flexion 5/5       knee extension 5/5       ankle dorsiflexion 5/5       ankle plantarflexion 5/5       dorsiflexion of the great toe 5/5    Reflexes:       patellar (L3, L4) symmetric normal       achilles tendons (S1) symmetric normal    Sensation:      grossly intact throughout lower extremities    Skin:       well perfused       capillary refill brisk    Special tests:       straight leg raise left neg        straight leg raise right neg       neg (-) SYED        slump test neg         Neg FADIR        Radiology:  Prior lumbar imaging and report reviewed on [facility]    Assessment:  1. DDD (degenerative disc disease), lumbar    2. Chronic bilateral low back pain with sciatica, sciatica laterality unspecified        Plan:  Discussed the assessment with the patient.  Follow up: prn  Known mild grade I degenerative spondylolisthesis at L4-5 creating recess stenosis L>R on " last imaging  Prior TFESIs have been helpful, now looking to supplement with PT  PT ordered today  Consider repeat PRAVIN in future, though no current need  Low impact activity options and strategies reviewed, as well as utilizing Silver Sneakers  Supportive care reviewed  All questions were answered today  Contact us with additional questions or concerns  Signs and sx of concern reviewed      Tre Wilson DO, CADORINA  Primary Care Sports Medicine  [facility]             Disclaimer: This note consists of symbols derived from keyboarding, dictation and/or voice recognition software. As a result, there may be errors in the script that have gone undetected. Please consider this when interpreting information found in this chart.

## 2019-01-18 NOTE — LETTER
2019         RE: Jcarlos Alvarez  [address] [facility] [address]        Dear Colleague,    Thank you for referring your patient, Jcarlos Alvarez, to the [facility] [place]. Please see a copy of my visit note below.    Jcarlos Alvarez  :  1950  DOS: 19  MRN: [id number]    Sports Medicine Clinic Visit    PCP: Fuentes Sims    Jcarlos Alvarez is a 67 year old male who is seen in follow-up from ZENAIDA Kirkpatrick presenting with chronic radiating low back pain.    Injury: Gradual onset of chronic low back pain over the last ~ 4 years, that has been mostly left sided.  Notes radiating right sided sx over the last ~ 2 - 3 months.  Pain located over mild right lower lumbar spine, radiating to right LE.  Reports intermittent radiating pain to right gastroc and numbness in plantar foot.  Additional Features:  Positive: numbness.  Symptoms are better with Rest and elliptical.  Symptoms are worse with: repetitive lifting, bending at waist, prolonged standing.  Other evaluation and/or treatments so far consists of: Ibuprofen and Rest.  Recent imaging completed: MRI completed  @ [facility].  Prior History of related problems: Patient was last treated for this condition in 2017.  Left L5-S1 transforaminal epidural steroid injection completed at [facility] on 17 provided excellent relief of left sided sx.    Social History: works in construction    Interim History - 2019  Since last visit on 18 patient has mild low back pain.  Patient reports that bilateral L4-L5 transforaminal epidural steroid injection completed on 12/10 (right),  (left) has provided good relief.  Patient would like referral to physical therapy.  No interim injury.       Review of Systems  Musculoskeletal: as above  Remainder of review of systems is negative including constitutional, CV, pulmonary, GI, Skin and Neurologic except as noted in HPI or medical history.    Past  "Medical History:   Diagnosis Date     CAD (coronary artery disease)      Cervicalgia      Diverticula of colon 2009    Diverticulosis sigmoid colon.     Hypertension      Old bucket handle tear of medial meniscus     right     Other affections of shoulder region, not elsewhere classified      Past Surgical History:   Procedure Laterality Date     C EXCIS KNEE CARTILAGE,MEDIAL OR LAT      Right     TONSILLECTOMY         Objective  /79   Ht 1.759 m (5' 9.25\")   Wt 96.6 kg (213 lb)   BMI 31.23 kg/m       General: healthy, alert and in no distress    HEENT: no scleral icterus or conjunctival erythema   Skin: no suspicious lesions or rash. No jaundice.   CV: regular rhythm by palpation, 2+ distal pulses, no pedal edema    Resp: normal respiratory effort without conversational dyspnea   Psych: normal mood and affect    Gait: nonantalgic, appropriate coordination and balance   Neuro: normal light touch sensory exam of the extremities. Motor strength as noted below     Low back exam:    Inspection:       no visible deformity in the low back       normal skin       normal vascular       normal lymphatic    ROM:       full flexion       full extension    Tender:       paraspinal muscles mild R>L lower lumbar paraspinals    Non Tender:       remainder of lumbar spine       midline       SI joints    Strength:       hip flexion 5/5       knee extension 5/5       ankle dorsiflexion 5/5       ankle plantarflexion 5/5       dorsiflexion of the great toe 5/5    Reflexes:       patellar (L3, L4) symmetric normal       achilles tendons (S1) symmetric normal    Sensation:      grossly intact throughout lower extremities    Skin:       well perfused       capillary refill brisk    Special tests:       straight leg raise left neg        straight leg raise right neg       neg (-) SYED        slump test neg         Neg FADIR        Radiology:  Prior lumbar imaging and report reviewed on [facility]    Assessment:  1. DDD (degenerative " disc disease), lumbar    2. Chronic bilateral low back pain with sciatica, sciatica laterality unspecified        Plan:  Discussed the assessment with the patient.  Follow up: prn  Known mild grade I degenerative spondylolisthesis at L4-5 creating recess stenosis L>R on last imaging  Prior TFESIs have been helpful, now looking to supplement with PT  PT ordered today  Consider repeat PRAVIN in future, though no current need  Low impact activity options and strategies reviewed, as well as utilizing [place] Sneakers  Supportive care reviewed  All questions were answered today  Contact us with additional questions or concerns  Signs and sx of concern reviewed      Tre Wilson DO, SRINIVAS  Primary Care Sports Medicine  [facility]             Disclaimer: This note consists of symbols derived from keyboarding, dictation and/or voice recognition software. As a result, there may be errors in the script that have gone undetected. Please consider this when interpreting information found in this chart.    Again, thank you for allowing me to participate in the care of your patient.        Sincerely,        Tre Wilson DO

## 2019-01-18 NOTE — PROGRESS NOTES
Subjective:  HPI                    Objective:  System    Physical Exam    General     ROS    Assessment/Plan:    DISCHARGE REPORT    Progress reporting period is from 1/3/19 to 1/18/19.       SUBJECTIVE  Subjective: Not really having any pain at rest.  Gets tingling very rarely and it's less than it used to be    Current Pain level: 1/10.     Initial Pain level: 5/10.   Changes in function:  Yes (See Goal flowsheet attached for changes in current functional level)  Adverse reaction to treatment or activity: None    OBJECTIVE  Objective: While pt continues to have a FH posture he is much more aware and neds less cuing for correction.  No tingling with retraction/extension over edge of table     ASSESSMENT/PLAN  Updated problem list and treatment plan: Diagnosis 1:  Cervical radiculopathy    STG/LTGs have been met or progress has been made towards goals:  Yes (See Goal flow sheet completed today.)  Assessment of Progress: The patient's condition is improving.  The patient has met all of their long term goals.  Self Management Plans:  Patient is independent in a home treatment program.  Jcarlos continues to require the following intervention to meet STG and LTG's:  PT intervention is no longer required to meet STG/LTG.    Recommendations:  This patient is ready to be discharged from therapy and continue their home treatment program.    Please refer to the daily flowsheet for treatment today, total treatment time and time spent performing 1:1 timed codes.

## 2019-02-01 ENCOUNTER — THERAPY VISIT (OUTPATIENT)
Dept: PHYSICAL THERAPY | Facility: CLINIC | Age: 69
End: 2019-02-01
Payer: MEDICARE

## 2019-02-01 DIAGNOSIS — G89.29 CHRONIC BILATERAL LOW BACK PAIN WITHOUT SCIATICA: ICD-10-CM

## 2019-02-01 DIAGNOSIS — M54.50 CHRONIC BILATERAL LOW BACK PAIN WITHOUT SCIATICA: ICD-10-CM

## 2019-02-01 DIAGNOSIS — M51.369 DDD (DEGENERATIVE DISC DISEASE), LUMBAR: ICD-10-CM

## 2019-02-01 DIAGNOSIS — M54.40 CHRONIC BILATERAL LOW BACK PAIN WITH SCIATICA, SCIATICA LATERALITY UNSPECIFIED: ICD-10-CM

## 2019-02-01 DIAGNOSIS — G89.29 CHRONIC BILATERAL LOW BACK PAIN WITH SCIATICA, SCIATICA LATERALITY UNSPECIFIED: ICD-10-CM

## 2019-02-01 PROCEDURE — 97110 THERAPEUTIC EXERCISES: CPT | Mod: GP | Performed by: PHYSICAL THERAPIST

## 2019-02-01 PROCEDURE — 97161 PT EVAL LOW COMPLEX 20 MIN: CPT | Mod: GP | Performed by: PHYSICAL THERAPIST

## 2019-02-01 NOTE — PROGRESS NOTES
[facility] Initial Evaluation  Subjective:  The history is provided by the patient. No  was used.   Jcarlos Alvarez is a 68 year old male with a lumbar condition.  Condition occurred with:  Degenerative joint disease.  Condition occurred: for unknown reasons.  This is a chronic condition  6-7 years of LBP due to DJD.  Gets ESIs 2x/year to manage.  Pt was referred to PT on 1/18/19.    Patient reports pain:  Lower lumbar spine.  Radiates to:  Foot left and foot right (oftens switches from side to side and not always to the foot).  Pain is described as aching and shooting and is constant and reported as 2/10.  Associated symptoms:  Loss of strength, loss of motion/stiffness and tingling.   Symptoms are exacerbated by standing (ladders) and relieved by NSAID's.  Since onset symptoms are unchanged.  Special tests:  MRI (multilivel DJD).      General health as reported by patient is good.  Pertinent medical history includes:  High blood pressure.  Medical allergies: no.  Other surgeries include:  No.  Current medications:  High blood pressure medication.  Current occupation is .  Patient is working in normal job without restrictions.  Primary job tasks include:  Lifting, prolonged standing and repetitive tasks.    Barriers include:  None as reported by the patient.    Red flags:  None as reported by the patient.                        Objective:  System         Lumbar/SI Evaluation  ROM:    AROM Lumbar:   Flexion:          WNL, no change with repeated  Ext:                    Mod loss, no change with repeated   Side Bend:        Left:     Right:   Rotation:           Left:     Right:   Side Glide:        Left:  Mild loss, R foot tingled with repeated    Right:  Mild loss, L foot tingled with repeated          Lumbar Myotomes:  normal            Lumbar DTR's:  normal        Lumbar Dermtomes:  normal                Neural Tension/Mobility:      Left side:SLR  negative.      Right side:   SLR  negative.         Spinal Segmental Conclusions:     Level: Hypo noted at L3, L4, L5 and S1                                        Hip Evaluation  Hip PROM:            Internal Rotation: Left: 10    Right: 10  External Rotation: Left: 30    Right: 30                               General     ROS    Assessment/Plan:    Patient is a 68 year old male with lumbar complaints.    Patient has the following significant findings with corresponding treatment plan.                Diagnosis 1:  LBP  Pain -  manual therapy, self management, education and home program  Decreased ROM/flexibility - manual therapy, therapeutic exercise and home program  Decreased joint mobility - manual therapy, therapeutic exercise and home program  Decreased function - therapeutic activities and home program    Therapy Evaluation Codes:   1) History comprised of:   Personal factors that impact the plan of care:      Time since onset of symptoms.    Comorbidity factors that impact the plan of care are:      None.     Medications impacting care: None.  2) Examination of Body Systems comprised of:   Body structures and functions that impact the plan of care:      Lumbar spine.   Activity limitations that impact the plan of care are:      Standing.  3) Clinical presentation characteristics are:   Stable/Uncomplicated.  4) Decision-Making    Low complexity using standardized patient assessment instrument and/or measureable assessment of functional outcome.  Cumulative Therapy Evaluation is: Low complexity.    Previous and current functional limitations:  (See Goal Flow Sheet for this information)    Short term and Long term goals: (See Goal Flow Sheet for this information)     Communication ability:  Patient appears to be able to clearly communicate and understand verbal and written communication and follow directions correctly.  Treatment Explanation - The following has been discussed with the patient:   RX ordered/plan of  care  Anticipated outcomes  Possible risks and side effects  This patient would benefit from PT intervention to resume normal activities.   Rehab potential is good.    Frequency:  1 X week, once daily  Duration:  for 4 weeks  Discharge Plan:  Achieve all LTG.  Independent in home treatment program.  Reach maximal therapeutic benefit.    Please refer to the daily flowsheet for treatment today, total treatment time and time spent performing 1:1 timed codes.

## 2019-02-01 NOTE — LETTER
DEPARTMENT OF HEALTH AND HUMAN SERVICES  CENTERS FOR MEDICARE & MEDICAID SERVICES    PLAN/UPDATED PLAN OF PROGRESS FOR OUTPATIENT REHABILITATION    PATIENTS NAME:  Jcarlos Alvarez   : 1950  PROVIDER NUMBER:    [id number]  HICN: [id number]  PROVIDER NAME: [facility]  MEDICAL RECORD NUMBER: [id number]   START OF CARE DATE:  SOC Date: 19   TYPE:  PT  PRIMARY/TREATMENT DIAGNOSIS: (Pertinent Medical Diagnosis)  DDD (degenerative disc disease), lumbar  Chronic bilateral low back pain with sciatica, sciatica laterality unspecified  Chronic bilateral low back pain without sciatica  VISITS FROM START OF CARE:        [place] [facility] Initial Evaluation  Subjective:  The history is provided by the patient. No  was used.   Jcarlos Alvarez is a 68 year old male with a lumbar condition.  Condition occurred with:  Degenerative joint disease.  Condition occurred: for unknown reasons.  This is a chronic condition  6-7 years of LBP due to DJD.  Gets ESIs 2x/year to manage.  Pt was referred to PT on 19.  Patient reports pain:  Lower lumbar spine.  Radiates to:  Foot left and foot right (oftens switches from side to side and not always to the foot).  Pain is described as aching and shooting and is constant and reported as 2/10.  Associated symptoms:  Loss of strength, loss of motion/stiffness and tingling.   Symptoms are exacerbated by standing (ladders) and relieved by NSAID's.  Since onset symptoms are unchanged.  Special tests:  MRI (multilivel DJD).   General health as reported by patient is good.  Pertinent medical history includes:  High blood pressure.  Medical allergies: no.  Other surgeries include:  No.  Current medications:  High blood pressure medication.  Current occupation is .  Patient is working in normal job without restrictions.  Primary job tasks include:  Lifting, prolonged standing and repetitive tasks.  Barriers  include:  None as reported by the patient.  Red flags:  None as reported by the patient.               Objective:  Lumbar/SI Evaluation  ROM:    AROM Lumbar:   Flexion:          WNL, no change with repeated  Ext:                    Mod loss, no change with repeated   Side Bend:        Left:     Right:   Rotation:           Left:     Right:   Side Glide:        Left:  Mild loss, R foot tingled with repeated    Right:  Mild loss, L foot tingled with repeated        Lumbar Myotomes:  normal  Lumbar DTR's:  normal  Lumbar Dermtomes:  normal  Neural Tension/Mobility:    Left side:SLR  negative.   Right side:   SLR  negative.   PATIENTS NAME:  Jcarlos Alvarez   : 1950    Spinal Segmental Conclusions:   Level: Hypo noted at L3, L4, L5 and S1  Hip Evaluation  Hip PROM:    Internal Rotation: Left: 10    Right: 10  External Rotation: Left: 30    Right: 30      Assessment/Plan:    Patient is a 68 year old male with lumbar complaints.    Patient has the following significant findings with corresponding treatment plan.                Diagnosis 1:  LBP  Pain -  manual therapy, self management, education and home program  Decreased ROM/flexibility - manual therapy, therapeutic exercise and home program  Decreased joint mobility - manual therapy, therapeutic exercise and home program  Decreased function - therapeutic activities and home program    Therapy Evaluation Codes:   1) History comprised of:   Personal factors that impact the plan of care:      Time since onset of symptoms.    Comorbidity factors that impact the plan of care are:      None.     Medications impacting care: None.  2) Examination of Body Systems comprised of:   Body structures and functions that impact the plan of care:      Lumbar spine.   Activity limitations that impact the plan of care are:      Standing.  3) Clinical presentation characteristics are:   Stable/Uncomplicated.  4) Decision-Making    Low complexity using standardized patient  "assessment instrument and/or measureable assessment of functional outcome.  Cumulative Therapy Evaluation is: Low complexity.  Previous and current functional limitations:  (See Goal Flow Sheet for this information)    Short term and Long term goals: (See Goal Flow Sheet for this information)   Communication ability:  Patient appears to be able to clearly communicate and understand verbal and written communication and follow directions correctly.  Treatment Explanation - The following has been discussed with the patient:   RX ordered/plan of care  Anticipated outcomes  Possible risks and side effects  This patient would benefit from PT intervention to resume normal activities.   Rehab potential is good.  Frequency:  1 X week, once daily  Duration:  for 4 weeks  Discharge Plan:  Achieve all LTG.  Independent in home treatment program.  Reach maximal therapeutic benefit.    PATIENTS NAME:  Jcarlos Alvarez   : 1950    Please refer to the daily flowsheet for treatment today, total treatment time and time spent performing 1:1 timed codes.         Caregiver Signature/Credentials _____________________________ Date ________      Treating Provider: Corey Hernandez, PT   I have reviewed and certified the need for these services and plan of treatment while under my care.        PHYSICIAN'S SIGNATURE:   _________________________________________  Date___________   Tre ESPINO    Certification period:  Beginning of Cert date period: 19 to  End of Cert period date: 19     Functional Level Progress Report: Please see attached \"Goal Flow sheet for Functional level.\"    ____X____ Continue Services or       ________ DC Services                Service dates: From  SOC Date: 19 date to present                         "

## 2019-02-08 ENCOUNTER — THERAPY VISIT (OUTPATIENT)
Dept: PHYSICAL THERAPY | Facility: CLINIC | Age: 69
End: 2019-02-08
Payer: MEDICARE

## 2019-02-08 DIAGNOSIS — G89.29 CHRONIC BILATERAL LOW BACK PAIN WITHOUT SCIATICA: ICD-10-CM

## 2019-02-08 DIAGNOSIS — M54.50 CHRONIC BILATERAL LOW BACK PAIN WITHOUT SCIATICA: ICD-10-CM

## 2019-02-08 PROCEDURE — 97140 MANUAL THERAPY 1/> REGIONS: CPT | Mod: GP | Performed by: PHYSICAL THERAPIST

## 2019-02-08 PROCEDURE — 97110 THERAPEUTIC EXERCISES: CPT | Mod: GP | Performed by: PHYSICAL THERAPIST

## 2019-02-12 DIAGNOSIS — I10 HYPERTENSION GOAL BP (BLOOD PRESSURE) < 140/90: ICD-10-CM

## 2019-02-12 DIAGNOSIS — E78.5 DYSLIPIDEMIA: ICD-10-CM

## 2019-02-13 RX ORDER — ATORVASTATIN CALCIUM 80 MG/1
TABLET, FILM COATED ORAL
Qty: 30 TABLET | Refills: 0 | Status: SHIPPED | OUTPATIENT
Start: 2019-02-13

## 2019-02-13 RX ORDER — LOSARTAN POTASSIUM AND HYDROCHLOROTHIAZIDE 12.5; 5 MG/1; MG/1
1 TABLET ORAL DAILY
Qty: 30 TABLET | Refills: 0 | Status: SHIPPED | OUTPATIENT
Start: 2019-02-13

## 2019-02-13 RX ORDER — AMLODIPINE BESYLATE 5 MG/1
TABLET ORAL
Qty: 30 TABLET | Refills: 0 | Status: SHIPPED | OUTPATIENT
Start: 2019-02-13

## 2019-02-13 NOTE — TELEPHONE ENCOUNTER
Medication is being filled for 1 time refill only due to:  Patient needs to be seen for fasting lab appointment and appointment with the provider for further refills. Hypertension and cholesterol.  Sharmin MICHAELN, RN

## 2019-02-15 ENCOUNTER — THERAPY VISIT (OUTPATIENT)
Dept: PHYSICAL THERAPY | Facility: CLINIC | Age: 69
End: 2019-02-15
Payer: MEDICARE

## 2019-02-15 DIAGNOSIS — M54.50 CHRONIC BILATERAL LOW BACK PAIN WITHOUT SCIATICA: ICD-10-CM

## 2019-02-15 DIAGNOSIS — G89.29 CHRONIC BILATERAL LOW BACK PAIN WITHOUT SCIATICA: ICD-10-CM

## 2019-02-15 PROCEDURE — 97140 MANUAL THERAPY 1/> REGIONS: CPT | Mod: GP | Performed by: PHYSICAL THERAPIST

## 2019-02-15 PROCEDURE — 97110 THERAPEUTIC EXERCISES: CPT | Mod: GP | Performed by: PHYSICAL THERAPIST

## 2019-02-15 NOTE — PROGRESS NOTES
SUBJECTIVE  Subjective: On the days he has a normal workload the back pain is lesseneing.  On the hard days it is the same   Current Pain level: 2/10   Changes in function:  None  Adverse reaction to treatment or activity:  None    OBJECTIVE  Objective: Overall lumbar extension has improved, especially between first and second visit.  Diastisis recti present     ASSESSMENT  Jcarlos continues to require intervention to meet STG and LTG's: PT  Patient is progressing as expected.  Response to therapy has shown an improvement in  pain level  Progress made towards STG/LTG?  No goals met to date    PLAN  Current treatment program is being advanced to more complex exercises.    PTA/ATC plan:  N/A    Please refer to the daily flowsheet for treatment today, total treatment time and time spent performing 1:1 timed codes.

## 2019-02-22 ENCOUNTER — THERAPY VISIT (OUTPATIENT)
Dept: PHYSICAL THERAPY | Facility: CLINIC | Age: 69
End: 2019-02-22
Payer: MEDICARE

## 2019-02-22 DIAGNOSIS — M54.50 CHRONIC BILATERAL LOW BACK PAIN WITHOUT SCIATICA: ICD-10-CM

## 2019-02-22 DIAGNOSIS — G89.29 CHRONIC BILATERAL LOW BACK PAIN WITHOUT SCIATICA: ICD-10-CM

## 2019-02-22 PROCEDURE — 97110 THERAPEUTIC EXERCISES: CPT | Mod: GP | Performed by: PHYSICAL THERAPIST

## 2019-02-22 PROCEDURE — 97140 MANUAL THERAPY 1/> REGIONS: CPT | Mod: GP | Performed by: PHYSICAL THERAPIST

## 2019-02-22 NOTE — PROGRESS NOTES
DISCHARGE REPORT    Progress reporting period is from 2/1/19 to 2/22/19.       SUBJECTIVE  Subjective: Same as last week, the more he works the more sore he gets but overall it's better since starting PT    Current Pain level: 2/10.     Initial Pain level: 3/10.   Changes in function:  Yes (See Goal flowsheet attached for changes in current functional level)  Adverse reaction to treatment or activity: None    OBJECTIVE  Objective: Improved lumbar extension over the course of the last 3 weeks but still very limited due to stenosis/DJD     ASSESSMENT/PLAN  Updated problem list and treatment plan: Diagnosis 1:  LBP  Pain -  home program  Decreased ROM/flexibility - home program  Decreased strength - home program  STG/LTGs have been met or progress has been made towards goals:  Yes (See Goal flow sheet completed today.)  Assessment of Progress: The patient's condition is improving.  Patient is meeting short term goals and is progressing towards long term goals.  Self Management Plans:  Patient is independent in a home treatment program.  Jcarlos continues to require the following intervention to meet STG and LTG's:  PT intervention is no longer required to meet STG/LTG.    Recommendations:  This patient is ready to be discharged from therapy and continue their home treatment program.    Please refer to the daily flowsheet for treatment today, total treatment time and time spent performing 1:1 timed codes.

## 2019-05-29 ENCOUNTER — TRANSFERRED RECORDS (OUTPATIENT)
Dept: HEALTH INFORMATION MANAGEMENT | Facility: CLINIC | Age: 69
End: 2019-05-29

## 2019-06-12 ENCOUNTER — TRANSFERRED RECORDS (OUTPATIENT)
Dept: HEALTH INFORMATION MANAGEMENT | Facility: CLINIC | Age: 69
End: 2019-06-12

## 2019-07-19 ENCOUNTER — TRANSFERRED RECORDS (OUTPATIENT)
Dept: HEALTH INFORMATION MANAGEMENT | Facility: CLINIC | Age: 69
End: 2019-07-19

## 2019-09-16 ENCOUNTER — THERAPY VISIT (OUTPATIENT)
Dept: PHYSICAL THERAPY | Facility: CLINIC | Age: 69
End: 2019-09-16
Payer: MEDICARE

## 2019-09-16 DIAGNOSIS — M54.12 CERVICAL RADICULOPATHY: Primary | ICD-10-CM

## 2019-09-16 PROCEDURE — 97140 MANUAL THERAPY 1/> REGIONS: CPT | Mod: GP | Performed by: PHYSICAL THERAPIST

## 2019-09-16 PROCEDURE — 97110 THERAPEUTIC EXERCISES: CPT | Mod: GP | Performed by: PHYSICAL THERAPIST

## 2019-09-16 PROCEDURE — 97161 PT EVAL LOW COMPLEX 20 MIN: CPT | Mod: GP | Performed by: PHYSICAL THERAPIST

## 2019-09-16 NOTE — PROGRESS NOTES
[place] [facility] Initial Evaluation  Subjective:  The history is provided by the patient. No  was used.   Jcarlos Alvarez being seen for neck and L arm.   Problem began 9/10/2019 (date of referral). Where condition occurred: at home and at work.Problem occurred: likely overuse at work  and reported as 4/10 on pain scale. General health as reported by patient is good. Pertinent medical history includes:  Heart problems and numbness/tingling.    Surgeries include:  None.  Current medications:  None.   Primary job tasks include:  Repetitive tasks and prolonged standing. Other job/home tasks details: overhead work.  Pain is described as aching and shooting and is constant. Pain is the same all the time. Since onset symptoms are gradually worsening.  Previous treatment includes physical therapy. There was significant improvement following previous treatment.    Restrictions include:  Working in normal job without restrictions.    Barriers include:  None as reported by patient.  Red flags:  None as reported by patient.  Type of problem:  Cervical spine   Condition occurred with:  Repetition/overuse. This is a recurrent condition   Problem details: Started getting a similar pain in neck and shoulder to the one he had 8 months ago when he was last in PT.  Likely due to the work he does as an  but there was no specific injury.   Patient reports pain:  Lower cervical spine. Radiates to:  Lower arm left. Associated symptoms:  Loss of motion/stiffness, tingling and numbness. Symptoms are exacerbated by looking up or down, change of position and lifting and relieved by ice.                      Objective:  Standing Alignment:    Cervical/Thoracic:  Forward head and thoracic kyphosis increased  Shoulder/UE:  Rounded shoulders                                  Cervical/Thoracic Evaluation  Arom wnl cervical: retraction: worse in sitting, better in supine.     AROM:  AROM  Cervical:    Flexion:            WNL, no change  Extension:       Mod loss, increased UE sxs  Rotation:         Left: mild loss, no change     Right: mild loss, decreased UE pain  Side Bend:      Left: major loss, increased UE pain     Right:  Major loss, decreased UE pain        Cervical Myotomes:  Cervical myotomes: Abductor digiti minimi: R: 5/5, L: 3+/5.  C1-2 (Neck Flex): Left:  5    Right: 5  C3 (neck side bend): Left: 5    Right: 5  C4 (shrug):  Left: 5    Right: 5  C5 (Deltoid):  Left: 5      C6 (Biceps):  Left: 5    Right: 5  C7 (Triceps):  Left: 5    Right: 5  C8 (Thumb Ext): Left: 5    Right: 5        Cervical Dermatomes:  normal                          Spinal Segmental Conclusions:    Level:  Hypo at C7, T1 and T2                                                General     ROS    Assessment/Plan:    Patient is a 69 year old male with cervical complaints.    Patient has the following significant findings with corresponding treatment plan.                Diagnosis 1:  Cervical radiculopathy  Pain -  manual therapy, self management, education and home program  Decreased ROM/flexibility - manual therapy, therapeutic exercise and home program  Decreased joint mobility - manual therapy, therapeutic exercise and home program  Decreased strength - therapeutic exercise, therapeutic activities and home program  Impaired posture - neuro re-education and home program      Previous and current functional limitations:  (See Goal Flow Sheet for this information)    Short term and Long term goals: (See Goal Flow Sheet for this information)     Communication ability:  Patient appears to be able to clearly communicate and understand verbal and written communication and follow directions correctly.  Treatment Explanation - The following has been discussed with the patient:   RX ordered/plan of care  Anticipated outcomes  Possible risks and side effects  This patient would benefit from PT intervention to resume normal  activities.   Rehab potential is good.    Frequency:  1 X week, once daily  Duration:  for 4 weeks  Discharge Plan:  Achieve all LTG.  Independent in home treatment program.  Reach maximal therapeutic benefit.    Please refer to the daily flowsheet for treatment today, total treatment time and time spent performing 1:1 timed codes.

## 2019-09-16 NOTE — LETTER
DEPARTMENT OF HEALTH AND HUMAN SERVICES  CENTERS FOR MEDICARE & MEDICAID SERVICES    PLAN/UPDATED PLAN OF PROGRESS FOR OUTPATIENT REHABILITATION    PATIENTS NAME:  Jcarlos Alvarez   : 1950  PROVIDER NUMBER:    [id number]  HICN:  [id number]  PROVIDER NAME: KATHRYN PATEL  MEDICAL RECORD NUMBER: [id number]   START OF CARE DATE:  SOC Date: 19   TYPE:  PT  PRIMARY/TREATMENT DIAGNOSIS: (Pertinent Medical Diagnosis)  Cervical radiculopathy  VISITS FROM START OF CARE:  Rxs Used: 1     [place] [facility] Initial Evaluation  Subjective:  The history is provided by the patient. No  was used.   Jcarlos Alvarez being seen for neck and L arm.   Problem began 9/10/2019 (date of referral). Where condition occurred: at home and at work.Problem occurred: likely overuse at work  and reported as 4/10 on pain scale. General health as reported by patient is good. Pertinent medical history includes:  Heart problems and numbness/tingling.  Surgeries include:  None.  Current medications:  None.   Primary job tasks include:  Repetitive tasks and prolonged standing. Other job/home tasks details: overhead work.  Pain is described as aching and shooting and is constant. Pain is the same all the time. Since onset symptoms are gradually worsening.  Previous treatment includes physical therapy. There was significant improvement following previous treatment.   Restrictions include:  Working in normal job without restrictions.  Barriers include:  None as reported by patient.  Red flags:  None as reported by patient.  Type of problem:  Cervical spine  Condition occurred with:  Repetition/overuse. This is a recurrent condition   Problem details: Started getting a similar pain in neck and shoulder to the one he had 8 months ago when he was last in PT.  Likely due to the work he does as an  but there was no specific injury.   Patient reports pain:  Lower cervical spine. Radiates to:  Lower  arm left. Associated symptoms:  Loss of motion/stiffness, tingling and numbness. Symptoms are exacerbated by looking up or down, change of position and lifting and relieved by ice.                Objective:  Standing Alignment:    Cervical/Thoracic:  Forward head and thoracic kyphosis increased  Shoulder/UE:  Rounded shoulders  Cervical/Thoracic Evaluation  Arom wnl cervical: retraction: worse in sitting, better in supine.     AROM:  AROM Cervical:  Flexion:            WNL, no change  Extension:       Mod loss, increased UE sxs  Rotation:         Left: mild loss, no change     Right: mild loss, decreased UE pain  Side Bend:      Left: major loss, increased UE pain     Right:  Major loss, decreased UE pain  PATIENTS NAME:  Jcarlos Alvarez   : 1950    Cervical Myotomes:  Cervical myotomes: Abductor digiti minimi: R: 5/5, L: 3+/5.  C1-2 (Neck Flex): Left:  5    Right: 5  C3 (neck side bend): Left: 5    Right: 5  C4 (shrug):  Left: 5    Right: 5  C5 (Deltoid):  Left: 5      C6 (Biceps):  Left: 5    Right: 5  C7 (Triceps):  Left: 5    Right: 5  C8 (Thumb Ext): Left: 5    Right: 5  Cervical Dermatomes:  normal  Spinal Segmental Conclusions:    Level:  Hypo at C7, T1 and T2    Assessment/Plan:    Patient is a 69 year old male with cervical complaints.    Patient has the following significant findings with corresponding treatment plan.                Diagnosis 1:  Cervical radiculopathy  Pain -  manual therapy, self management, education and home program  Decreased ROM/flexibility - manual therapy, therapeutic exercise and home program  Decreased joint mobility - manual therapy, therapeutic exercise and home program  Decreased strength - therapeutic exercise, therapeutic activities and home program  Impaired posture - neuro re-education and home program  Previous and current functional limitations:  (See Goal Flow Sheet for this information)    Short term and Long term goals: (See Goal Flow Sheet for this  "information)   Communication ability:  Patient appears to be able to clearly communicate and understand verbal and written communication and follow directions correctly.  Treatment Explanation - The following has been discussed with the patient:   RX ordered/plan of care  Anticipated outcomes  Possible risks and side effects  This patient would benefit from PT intervention to resume normal activities.   Rehab potential is good.  Frequency:  1 X week, once daily  Duration:  for 4 weeks  Discharge Plan:  Achieve all LTG.  Independent in home treatment program.  Reach maximal therapeutic benefit.  Please refer to the daily flowsheet for treatment today, total treatment time and time spent performing 1:1 timed codes.         Caregiver Signature/Credentials _____________________________ Date ________      Treating Provider: Corey Hernandez, PT   I have reviewed and certified the need for these services and plan of treatment while under my care.        PHYSICIAN'S SIGNATURE:   _________________________________________  Date___________   Tre ESPINO      PATIENTS NAME:  Jcarlos Alvarez   : 1950      Certification period:  Beginning of Cert date period: 19 to  End of Cert period date: 11/15/19     Functional Level Progress Report: Please see attached \"Goal Flow sheet for Functional level.\"    ____X____ Continue Services or       ________ DC Services                Service dates: From  SOC Date: 19 date to present                         "

## 2019-09-24 ENCOUNTER — THERAPY VISIT (OUTPATIENT)
Dept: PHYSICAL THERAPY | Facility: CLINIC | Age: 69
End: 2019-09-24
Payer: MEDICARE

## 2019-09-24 DIAGNOSIS — M54.12 CERVICAL RADICULOPATHY: Primary | ICD-10-CM

## 2019-09-24 PROCEDURE — 97110 THERAPEUTIC EXERCISES: CPT | Mod: GP | Performed by: PHYSICAL THERAPIST

## 2019-09-24 PROCEDURE — 97140 MANUAL THERAPY 1/> REGIONS: CPT | Mod: GP | Performed by: PHYSICAL THERAPIST

## 2019-10-01 ENCOUNTER — THERAPY VISIT (OUTPATIENT)
Dept: PHYSICAL THERAPY | Facility: CLINIC | Age: 69
End: 2019-10-01
Payer: MEDICARE

## 2019-10-01 DIAGNOSIS — M54.12 CERVICAL RADICULOPATHY: ICD-10-CM

## 2019-10-01 PROBLEM — M54.50 LOW BACK PAIN: Status: ACTIVE | Noted: 2017-04-13

## 2019-10-01 PROCEDURE — 97012 MECHANICAL TRACTION THERAPY: CPT | Mod: GP | Performed by: PHYSICAL THERAPIST

## 2019-10-01 PROCEDURE — 97140 MANUAL THERAPY 1/> REGIONS: CPT | Mod: GP | Performed by: PHYSICAL THERAPIST

## 2019-10-01 PROCEDURE — 97110 THERAPEUTIC EXERCISES: CPT | Mod: GP | Performed by: PHYSICAL THERAPIST

## 2019-10-01 NOTE — PROGRESS NOTES
SUBJECTIVE  Subjective: Not as much change as he had hoped.  Still has some tingling in L upper arm.   Current Pain level: 4/10   Changes in function:  None    Adverse reaction to treatment or activity:  None    OBJECTIVE  Objective: Cervical distraction resolved tingling in L UE     ASSESSMENT  Jcarlos continues to require intervention to meet STG and LTG's: PT  No change of symptoms has been noted.  Temporarily can resolve sxs with distraction  Response to therapy has shown lack of progress in  pain level and radicular symptoms  Progress made towards STG/LTG?  None    PLAN  The following modalities have been added:  mechanical traction    PTA/ATC plan:  N/A    Please refer to the daily flowsheet for treatment today, total treatment time and time spent performing 1:1 timed codes.

## 2019-10-08 ENCOUNTER — THERAPY VISIT (OUTPATIENT)
Dept: PHYSICAL THERAPY | Facility: CLINIC | Age: 69
End: 2019-10-08
Payer: MEDICARE

## 2019-10-08 DIAGNOSIS — M54.12 CERVICAL RADICULOPATHY: ICD-10-CM

## 2019-10-08 PROCEDURE — 97140 MANUAL THERAPY 1/> REGIONS: CPT | Mod: GP | Performed by: PHYSICAL THERAPIST

## 2019-10-08 PROCEDURE — 97110 THERAPEUTIC EXERCISES: CPT | Mod: GP | Performed by: PHYSICAL THERAPIST

## 2019-10-08 PROCEDURE — 97012 MECHANICAL TRACTION THERAPY: CPT | Mod: GP | Performed by: PHYSICAL THERAPIST

## 2019-10-08 NOTE — PROGRESS NOTES
PROGRESS  REPORT    Progress reporting period is from 9/16/19 to 10/8/19.       SUBJECTIVE  Subjective: Pain and tingling in L upper arm continues.  Liked the traction last time.  Some days are worse than others and usually it's based on his work    Current Pain level: 4/10.     Initial Pain level: 4/10.   Changes in function:  Yes (See Goal flowsheet attached for changes in current functional level)  Adverse reaction to treatment or activity: None    OBJECTIVE  Objective: Decrease in L UE sxs with axial distraction.  Responded to retraction and flexion with OP in supine in terms of reduced arm pain.  ABD digiti minimi strength is symmetrical now     ASSESSMENT/PLAN  Updated problem list and treatment plan: Diagnosis 1:  Cervical radiculopathy  Pain -  mechanical traction, manual therapy, self management, education and home program  Decreased ROM/flexibility - manual therapy, therapeutic exercise and home program  Decreased joint mobility - manual therapy, therapeutic exercise and home program  Impaired posture - neuro re-education and home program  STG/LTGs have been met or progress has been made towards goals:  Yes (See Goal flow sheet completed today.)  Assessment of Progress: The patient's condition has potential to improve.  Self Management Plans:  Patient has been instructed in a home treatment program.  Jcarlos continues to require the following intervention to meet STG and LTG's:  PT    Recommendations:  This patient would benefit from continued therapy.     Frequency:  1 X week, once daily  Duration:  for 4 weeks        Please refer to the daily flowsheet for treatment today, total treatment time and time spent performing 1:1 timed codes.

## 2019-10-10 ENCOUNTER — HOSPITAL ENCOUNTER (OUTPATIENT)
Dept: MRI IMAGING | Facility: CLINIC | Age: 69
Discharge: HOME | End: 2019-10-10
Attending: FAMILY MEDICINE | Admitting: FAMILY MEDICINE
Payer: MEDICARE

## 2019-10-10 ENCOUNTER — OFFICE VISIT (OUTPATIENT)
Dept: ORTHOPEDICS | Facility: CLINIC | Age: 69
End: 2019-10-10
Payer: MEDICARE

## 2019-10-10 ENCOUNTER — ANCILLARY PROCEDURE (OUTPATIENT)
Dept: GENERAL RADIOLOGY | Facility: CLINIC | Age: 69
End: 2019-10-10
Attending: FAMILY MEDICINE
Payer: MEDICARE

## 2019-10-10 VITALS
HEIGHT: 69 IN | SYSTOLIC BLOOD PRESSURE: 189 MMHG | BODY MASS INDEX: 31.99 KG/M2 | DIASTOLIC BLOOD PRESSURE: 78 MMHG | WEIGHT: 216 LBS

## 2019-10-10 DIAGNOSIS — M25.312 ROTATOR CUFF INSUFFICIENCY OF LEFT SHOULDER: ICD-10-CM

## 2019-10-10 DIAGNOSIS — M25.512 PAIN IN JOINT OF LEFT SHOULDER: ICD-10-CM

## 2019-10-10 DIAGNOSIS — M25.512 PAIN IN JOINT OF LEFT SHOULDER: Primary | ICD-10-CM

## 2019-10-10 DIAGNOSIS — M54.2 CERVICALGIA: ICD-10-CM

## 2019-10-10 PROCEDURE — 72040 X-RAY EXAM NECK SPINE 2-3 VW: CPT

## 2019-10-10 PROCEDURE — 73030 X-RAY EXAM OF SHOULDER: CPT | Mod: LT

## 2019-10-10 PROCEDURE — 99214 OFFICE O/P EST MOD 30 MIN: CPT | Performed by: FAMILY MEDICINE

## 2019-10-10 PROCEDURE — 73221 MRI JOINT UPR EXTREM W/O DYE: CPT | Mod: LT

## 2019-10-10 RX ORDER — OXYCODONE AND ACETAMINOPHEN 5; 325 MG/1; MG/1
1 TABLET ORAL EVERY 6 HOURS PRN
Qty: 20 TABLET | Refills: 0 | Status: SHIPPED | OUTPATIENT
Start: 2019-10-10

## 2019-10-10 RX ORDER — LISINOPRIL 20 MG/1
20 TABLET ORAL
COMMUNITY
Start: 2019-07-19 | End: 2020-07-17

## 2019-10-10 ASSESSMENT — MIFFLIN-ST. JEOR: SCORE: 1734.11

## 2019-10-10 NOTE — LETTER
10/10/2019         RE: Jcarlos Alvarez  [address] [facility] [id number]        Dear Colleague,    Thank you for referring your patient, Jcarlos Alvarez, to the [facility]. Please see a copy of my visit note below.    Jcarlos Alvarez  :  1950  DOS: 10/10/2019  MRN: [id number]    Sports Medicine Clinic Visit    PCP: Fuentes Sims    Jcarlos Alvarez is a 69 year old Right hand dominant male who is seen as a self referral presenting with acute on chronic left shoulder pain.    Injury: Gradual onset of flare of chronic left shoulder pain over the last 4 weeks, pain significantly worse over the last ~ 2 days.  Pain located over left posterior shoulder, periscapular region, radiating to left upper extremity.  Reports intermittent radiating, numbness and tingling to left hand.  Additional Features:  Positive: numbness, weakness and burning pain, decreased AROM.  Symptoms are better with Rest, ice.  Symptoms are worse with: lying on left shoulder, reaching, shoulder flexion/abduction.  Other evaluation and/or treatments so far consists of: Ice, Ibuprofen, Rest and physical therapy (4 sessions).  Recent imaging completed: No recent imaging completed.  Prior History of related problems: history of intermittent left shoulder pain over the past ~ 2+ years that usually improves with 3 - 4 visits of physical therapy.    Social History: works as an      Review of Systems  Musculoskeletal: as above  Remainder of review of systems is negative including constitutional, CV, pulmonary, GI, Skin and Neurologic except as noted in HPI or medical history.    Past Medical History:   Diagnosis Date     CAD (coronary artery disease)      Cervicalgia      Diverticula of colon 2009    Diverticulosis sigmoid colon.     Hypertension      Old bucket handle tear of medial meniscus     right     Other affections of shoulder region, not elsewhere classified      Past Surgical  "History:   Procedure Laterality Date     C EXCIS KNEE CARTILAGE,MEDIAL OR LAT      Right     TONSILLECTOMY       Family History   Problem Relation Age of Onset     C.A.ABRAHAM. Mother         stent age 75     Hypertension Mother      Asthma Paternal Grandfather      Diabetes Paternal Grandfather      Cardiovascular Brother      TRELL.A.ABRAHAM. Brother          MI at age 49     Hypertension Brother      Hypertension Sister      Cardiovascular Brother      TRELL.A.ABRAHAM. Brother         ASCVD age 55     Cancer - colorectal No family hx of      Prostate Cancer No family hx of        Objective  BP (!) 189/78   Ht 1.759 m (5' 9.25\")   Wt 98 kg (216 lb)   BMI 31.67 kg/m         General: healthy, alert and in no distress      HEENT: no scleral icterus or conjunctival erythema     Skin: no suspicious lesions or rash. No jaundice.     CV: regular rhythm by palpation, 2+ distal pulses, no pedal edema      Resp: normal respiratory effort without conversational dyspnea     Psych: normal mood and affect      Gait: nonantalgic, appropriate coordination and balance     Neuro: normal light touch sensory exam of the extremities. Motor strength as noted below     Left Shoulder exam    ROM:        Decreased active and passive ROM with flexion, abduction and external rotation.    Tender:        Mild over supraspinatus and infraspinatus    Non Tender:       remainder of shoulder       sternoclavicular joint       acromioclavicular joint    Strength:        abduction 2/5       internal rotation 5/5       external rotation 3/5       adduction 5/5    Impingement testing:        positive (+) Neer       neg (-) Sands       positive (+) empty can       neg (-) crossover       neg (-) O'linda    Stability testing:       neg (-) anterior glide       neg (-) sulcus sign    Skin:       no visible deformities       well perfused       capillary refill brisk    Sensation:        normal sensation over shoulder and upper extremity     Cervical spine Exam  Inspection: " normal cervical lordosis  Tender:  medial border of scapula, superior angle of scapula, left paracervical muscles, right paracervical muscles, left trapezius muscles, right trapezius muscles  Non-tender:  spinous processes  Range of Motion:  Full ROM of cervical spine, pain with most movements  Strength: Full strength of all neck muscles  Special tests:  Spurling's - negative - left, Spurling's - negative - right, neg adson's      Radiology  Recent Results (from the past 744 hour(s))   XR Shoulder Left G/E 3 Views    Narrative    XR SHOULDER LT G/E 3 VW   10/10/2019 2:50 PM     HISTORY:  Pain in joint of left shoulder    COMPARISON: 4/12/2017      Impression    IMPRESSION: No acute fracture or malalignment. Mild glenohumeral and  acromioclavicular joint degenerative changes. Minimal calcification  adjacent to the greater tuberosity suggests calcific tendinopathy.  Osteopenia. No osseous lesion.    HUSEYIN GASCA MD   XR Cervical Spine 2/3 Views    Narrative    CERVICAL SPINE TWO TO THREE VIEWS  10/10/2019 2:51 PM     COMPARISON: None.    HISTORY: Pain in joint of left shoulder.      Impression    IMPRESSION: The study is limited in that only C1-C6 are visualized on  the lateral view. Alignment of the visualized portions of the cervical  spine appear normal. There is no evidence for fracture of the  visualized portions of the cervical spine. There is no prevertebral  soft tissue swelling. There is facet arthropathy throughout the  visualized portions of the cervical spine.    MAHENDRA LEONARD MD         Assessment:  1. Pain in joint of left shoulder    2. Rotator cuff insufficiency of left shoulder    3. Cervicalgia        Plan:  Discussed the assessment with the patient.  Follow up: prn based on MRI results and clinical progress  No injury to associate with sudden weakness, exam similar to RTC tear, MRI ordered to clarify  Not suggestive of radicular issue either based on hx and exam  Consider EMG vs cervical  advanced imaging based on clinical progress  XR images independently visualized and reviewed with patient today in clinic  Rx for percocet provided for breakthrough sx/sleep, this will not be a long term plan  PT options reviewed  Home handouts provided and supportive care reviewed  All questions were answered today  Contact us with additional questions or concerns  Signs and sx of concern reviewed      Tre Wilson DO, SRINIVSA  Primary Care Sports Medicine  [facility]           Disclaimer: This note consists of symbols derived from keyboarding, dictation and/or voice recognition software. As a result, there may be errors in the script that have gone undetected. Please consider this when interpreting information found in this chart.    Again, thank you for allowing me to participate in the care of your patient.        Sincerely,        Tre Wilson DO

## 2019-10-10 NOTE — PROGRESS NOTES
Jcarlos Alvarez  :  1950  DOS: 10/10/2019  MRN: [id number]    Sports Medicine Clinic Visit    PCP: Fuentes Sims    Jcarlos Alvarez is a 69 year old Right hand dominant male who is seen as a self referral presenting with acute on chronic left shoulder pain.    Injury: Gradual onset of flare of chronic left shoulder pain over the last 4 weeks, pain significantly worse over the last ~ 2 days.  Pain located over left posterior shoulder, periscapular region, radiating to left upper extremity.  Reports intermittent radiating, numbness and tingling to left hand.  Additional Features:  Positive: numbness, weakness and burning pain, decreased AROM.  Symptoms are better with Rest, ice.  Symptoms are worse with: lying on left shoulder, reaching, shoulder flexion/abduction.  Other evaluation and/or treatments so far consists of: Ice, Ibuprofen, Rest and physical therapy (4 sessions).  Recent imaging completed: No recent imaging completed.  Prior History of related problems: history of intermittent left shoulder pain over the past ~ 2+ years that usually improves with 3 - 4 visits of physical therapy.    Social History: works as an      Review of Systems  Musculoskeletal: as above  Remainder of review of systems is negative including constitutional, CV, pulmonary, GI, Skin and Neurologic except as noted in HPI or medical history.    Past Medical History:   Diagnosis Date     CAD (coronary artery disease)      Cervicalgia      Diverticula of colon 2009    Diverticulosis sigmoid colon.     Hypertension      Old bucket handle tear of medial meniscus     right     Other affections of shoulder region, not elsewhere classified      Past Surgical History:   Procedure Laterality Date     C EXCIS KNEE CARTILAGE,MEDIAL OR LAT      Right     TONSILLECTOMY       Family History   Problem Relation Age of Onset     C.A.D. Mother         stent age 75     Hypertension Mother      Asthma Paternal Grandfather      Diabetes  "Paternal Grandfather      Cardiovascular Brother      TRELL.A.D. Brother          MI at age 49     Hypertension Brother      Hypertension Sister      Cardiovascular Brother      RTELL.A.D. Brother         ASCVD age 55     Cancer - colorectal No family hx of      Prostate Cancer No family hx of        Objective  BP (!) 189/78   Ht 1.759 m (5' 9.25\")   Wt 98 kg (216 lb)   BMI 31.67 kg/m        General: healthy, alert and in no distress      HEENT: no scleral icterus or conjunctival erythema     Skin: no suspicious lesions or rash. No jaundice.     CV: regular rhythm by palpation, 2+ distal pulses, no pedal edema      Resp: normal respiratory effort without conversational dyspnea     Psych: normal mood and affect      Gait: nonantalgic, appropriate coordination and balance     Neuro: normal light touch sensory exam of the extremities. Motor strength as noted below     Left Shoulder exam    ROM:        Decreased active and passive ROM with flexion, abduction and external rotation.    Tender:        Mild over supraspinatus and infraspinatus    Non Tender:       remainder of shoulder       sternoclavicular joint       acromioclavicular joint    Strength:        abduction 2/5       internal rotation 5/5       external rotation 3/5       adduction 5/5    Impingement testing:        positive (+) Neer       neg (-) Sands       positive (+) empty can       neg (-) crossover       neg (-) O'linda    Stability testing:       neg (-) anterior glide       neg (-) sulcus sign    Skin:       no visible deformities       well perfused       capillary refill brisk    Sensation:        normal sensation over shoulder and upper extremity     Cervical spine Exam  Inspection: normal cervical lordosis  Tender:  medial border of scapula, superior angle of scapula, left paracervical muscles, right paracervical muscles, left trapezius muscles, right trapezius muscles  Non-tender:  spinous processes  Range of Motion:  Full ROM of cervical spine, " pain with most movements  Strength: Full strength of all neck muscles  Special tests:  Spurling's - negative - left, Spurling's - negative - right, neg adson's      Radiology  Recent Results (from the past 744 hour(s))   XR Shoulder Left G/E 3 Views    Narrative    XR SHOULDER LT G/E 3 VW   10/10/2019 2:50 PM     HISTORY:  Pain in joint of left shoulder    COMPARISON: 4/12/2017      Impression    IMPRESSION: No acute fracture or malalignment. Mild glenohumeral and  acromioclavicular joint degenerative changes. Minimal calcification  adjacent to the greater tuberosity suggests calcific tendinopathy.  Osteopenia. No osseous lesion.    HUSEYIN GASCA MD   XR Cervical Spine 2/3 Views    Narrative    CERVICAL SPINE TWO TO THREE VIEWS  10/10/2019 2:51 PM     COMPARISON: None.    HISTORY: Pain in joint of left shoulder.      Impression    IMPRESSION: The study is limited in that only C1-C6 are visualized on  the lateral view. Alignment of the visualized portions of the cervical  spine appear normal. There is no evidence for fracture of the  visualized portions of the cervical spine. There is no prevertebral  soft tissue swelling. There is facet arthropathy throughout the  visualized portions of the cervical spine.    MAHENDRA LEONARD MD         Assessment:  1. Pain in joint of left shoulder    2. Rotator cuff insufficiency of left shoulder    3. Cervicalgia        Plan:  Discussed the assessment with the patient.  Follow up: prn based on MRI results and clinical progress  No injury to associate with sudden weakness, exam similar to RTC tear, MRI ordered to clarify  Not suggestive of radicular issue either based on hx and exam  Consider EMG vs cervical advanced imaging based on clinical progress  XR images independently visualized and reviewed with patient today in clinic  Rx for percocet provided for breakthrough sx/sleep, this will not be a long term plan  PT options reviewed  Home handouts provided and supportive care  reviewed  All questions were answered today  Contact us with additional questions or concerns  Signs and sx of concern reviewed      Tre Wilson DO, SRINIVAS  Primary Care Sports Medicine  [facility]           Disclaimer: This note consists of symbols derived from keyboarding, dictation and/or voice recognition software. As a result, there may be errors in the script that have gone undetected. Please consider this when interpreting information found in this chart.

## 2019-10-12 ENCOUNTER — NURSE TRIAGE (OUTPATIENT)
Dept: NURSING | Facility: CLINIC | Age: 69
End: 2019-10-12

## 2019-10-12 ENCOUNTER — TELEPHONE (OUTPATIENT)
Dept: ORTHOPEDICS | Facility: CLINIC | Age: 69
End: 2019-10-12

## 2019-10-12 NOTE — TELEPHONE ENCOUNTER
Patient states he is returning a call.  FNA read patient the note of LAURA Puentes ATC, dated 10/12/19 at 1:51PM.  Patient will call clinic on Monday.

## 2019-10-12 NOTE — TELEPHONE ENCOUNTER
Reviewed results of left shoulder MRI with Dr Wilson.  Overall reassuring, no rotator cuff tear, mild degenerative/overuse changes in rotator cuff and AC joint.  Obtain update of shoulder pain following trigger point injection on 10/10.  May offer subacromial steroid injection, if desired, otherwise continue to monitor with physical therapy.    [facility] for return call to discuss MRI results.     Dean Puentes, ATC

## 2019-10-15 ENCOUNTER — THERAPY VISIT (OUTPATIENT)
Dept: PHYSICAL THERAPY | Facility: CLINIC | Age: 69
End: 2019-10-15
Payer: MEDICARE

## 2019-10-15 DIAGNOSIS — M54.12 CERVICAL RADICULOPATHY: ICD-10-CM

## 2019-10-15 PROCEDURE — 97112 NEUROMUSCULAR REEDUCATION: CPT | Mod: GP | Performed by: PHYSICAL THERAPIST

## 2019-10-15 PROCEDURE — 97110 THERAPEUTIC EXERCISES: CPT | Mod: GP | Performed by: PHYSICAL THERAPIST

## 2019-10-15 NOTE — PROGRESS NOTES
SUBJECTIVE  Subjective: After last session he had a lot of pain and couldn't sleep.  See [employer] messages for additional info.  Saw Dr. Wilson and had MRI of shoulder.  It was negative for a RCT.  But still can't lift arm overhead and that has been worse   Current Pain level: 5/10   Changes in function:  None    Adverse reaction to treatment or activity:  None    OBJECTIVE  Objective: Unable to raise L arm above shoulder level without severe compensation with upper trap.  Passively has symmetrical shoulder flex.  MMT L shoulder flex: 2+/5, ER: 2+/5.  This has worsened since initial     ASSESSMENT  Jcarlos continues to require intervention to meet STG and LTG's: PT  Patient has experienced an exacerbation of symptoms.  Shoulder joint ROM is symmetrical and while he has general stiffness, it does not appear involved.  There is significant RC weakness but no RCT on MRI.  Still have to consider nerve entrapment at c-spine or periphery  Response to therapy has shown a worsening of  pain level and ROM   Progress made towards STG/LTG?  None    PLAN  Focus on new shoulder exercises and continue cervical.  Hold on traction for this week but consider restarting next time    PTA/ATC plan:  N/A    Please refer to the daily flowsheet for treatment today, total treatment time and time spent performing 1:1 timed codes.

## 2019-10-22 ENCOUNTER — THERAPY VISIT (OUTPATIENT)
Dept: PHYSICAL THERAPY | Facility: CLINIC | Age: 69
End: 2019-10-22
Payer: MEDICARE

## 2019-10-22 DIAGNOSIS — M54.12 CERVICAL RADICULOPATHY: ICD-10-CM

## 2019-10-22 PROCEDURE — 97110 THERAPEUTIC EXERCISES: CPT | Mod: GP | Performed by: PHYSICAL THERAPIST

## 2019-10-22 NOTE — PROGRESS NOTES
PROGRESS  REPORT    Progress reporting period is from 9/16/19 to 10/22/19.       SUBJECTIVE  Subjective: ROM is getting even worse, can't really lift his arm at all now.  Stopped doing any of the exercises    Current Pain level: 5/10.     Initial Pain level: 4/10.   Changes in function:  None  Adverse reaction to treatment or activity: None    OBJECTIVE  Objective: L shoulder AROM elvation to 40 deg with significant UT substitution.  L ER strength: 1+/5.  Extremely poor RC activation with palpation of muscle.  Cervical AROM has actually improved and is no longer causing pain on extension     ASSESSMENT/PLAN  Updated problem list and treatment plan: Diagnosis 1:  Cervical radiculopathy  Pain -  manual therapy, self management, education and home program  Decreased ROM/flexibility - manual therapy, therapeutic exercise and home program  Decreased strength - therapeutic exercise, therapeutic activities and home program  Impaired muscle performance - neuro re-education and home program  STG/LTGs have been met or progress has been made towards goals:  None  Assessment of Progress: The patient has had set backs in their progress.  The patient's condition has exacerbated.  Self Management Plans:  Patient has been instructed in a home treatment program.  Jcarlos continues to require the following intervention to meet STG and LTG's:  See recommendations    Recommendations:  This patient is ready to be discharged from therapy and continue their home treatment program.    Please refer to the daily flowsheet for treatment today, total treatment time and time spent performing 1:1 timed codes.

## 2019-10-31 ENCOUNTER — ANCILLARY PROCEDURE (OUTPATIENT)
Dept: MRI IMAGING | Facility: CLINIC | Age: 69
End: 2019-10-31
Attending: FAMILY MEDICINE
Payer: MEDICARE

## 2019-10-31 DIAGNOSIS — R29.898 ARM WEAKNESS: ICD-10-CM

## 2019-10-31 DIAGNOSIS — M54.2 CERVICALGIA: ICD-10-CM

## 2019-10-31 PROCEDURE — 72141 MRI NECK SPINE W/O DYE: CPT | Mod: TC

## 2019-11-04 ENCOUNTER — TELEPHONE (OUTPATIENT)
Dept: ORTHOPEDICS | Facility: CLINIC | Age: 69
End: 2019-11-04

## 2019-11-04 NOTE — TELEPHONE ENCOUNTER
"Reviewed MRI.  There is cervical DDD present at C4-5 and to a lesser extent at C5-6.  These nerve roots supply the suprascapular nerve, which innervates infraspinatus and supraspinatus.    So, while the MRI doesn't look \"terrible\", the wear and tear he has is in the area of concern.  Still hard to tell if this might be a central or peripheral nerve compression.    Options:  1. EMG  2. F/u in clinic to review what testing has been done so far and re-evaluate/re-examine  3. Trial of PRAVIN, though I would probably prefer to have a f/u visit first.    Please contact Riki with above thoughts, thanks.    Tre Wilson DO, CAQ  Primary Care Sports Medicine  [facility]       "

## 2019-11-04 NOTE — TELEPHONE ENCOUNTER
Spoke to patient discussed MRI results.  Patient will follow up with Dr Wilson on 11/8 for reassessment and discussion of possible cervical PRAVIN.     Dean Puentes ATC

## 2019-11-08 ENCOUNTER — OFFICE VISIT (OUTPATIENT)
Dept: ORTHOPEDICS | Facility: CLINIC | Age: 69
End: 2019-11-08
Payer: MEDICARE

## 2019-11-08 VITALS
SYSTOLIC BLOOD PRESSURE: 179 MMHG | DIASTOLIC BLOOD PRESSURE: 106 MMHG | HEIGHT: 69 IN | WEIGHT: 205 LBS | BODY MASS INDEX: 30.36 KG/M2

## 2019-11-08 DIAGNOSIS — M54.2 CERVICALGIA: ICD-10-CM

## 2019-11-08 DIAGNOSIS — M25.312 ROTATOR CUFF INSUFFICIENCY OF LEFT SHOULDER: ICD-10-CM

## 2019-11-08 DIAGNOSIS — G56.82 SUPRASCAPULAR MONONEUROPATHY, LEFT: ICD-10-CM

## 2019-11-08 DIAGNOSIS — R29.898 ARM WEAKNESS: Primary | ICD-10-CM

## 2019-11-08 PROCEDURE — 99214 OFFICE O/P EST MOD 30 MIN: CPT | Performed by: FAMILY MEDICINE

## 2019-11-08 ASSESSMENT — MIFFLIN-ST. JEOR: SCORE: 1684.21

## 2019-11-08 NOTE — PROGRESS NOTES
Jcarlos Alvarez  :  1950  DOS: 19  MRN: [id number]    Sports Medicine Clinic Visit    PCP: Fuentes Sims    Jcarlos Alvarez is a 69 year old Right hand dominant male who is seen as a self referral presenting with acute on chronic left shoulder pain.    Injury: Gradual onset of flare of chronic left shoulder pain over the last 4 weeks, pain significantly worse over the last ~ 2 days.  Pain located over left posterior shoulder, periscapular region, radiating to left upper extremity.  Reports intermittent radiating, numbness and tingling to left hand.  Additional Features:  Positive: numbness, weakness and burning pain, decreased AROM.  Symptoms are better with Rest, ice.  Symptoms are worse with: lying on left shoulder, reaching, shoulder flexion/abduction.  Other evaluation and/or treatments so far consists of: Ice, Ibuprofen, Rest and physical therapy (4 sessions).  Recent imaging completed: No recent imaging completed.  Prior History of related problems: history of intermittent left shoulder pain over the past ~ 2+ years that usually improves with 3 - 4 visits of physical therapy.    Social History: works as an      Interim History - 2019  Since last visit on 10/10/19 patient has only mild improvement in left shoulder pain and weakness.  Here to further review results of MRI 10/31/19.  No interim injury.         Review of Systems  Musculoskeletal: as above  Remainder of review of systems is negative including constitutional, CV, pulmonary, GI, Skin and Neurologic except as noted in HPI or medical history.    Past Medical History:   Diagnosis Date     CAD (coronary artery disease)      Cervicalgia      Diverticula of colon 2009    Diverticulosis sigmoid colon.     Hypertension      Old bucket handle tear of medial meniscus     right     Other affections of shoulder region, not elsewhere classified      Past Surgical History:   Procedure Laterality Date     C EXCIS KNEE  "CARTILAGE,MEDIAL OR LAT      Right     TONSILLECTOMY       Family History   Problem Relation Age of Onset     TRELL.A.ABRAHAM. Mother         stent age 75     Hypertension Mother      Asthma Paternal Grandfather      Diabetes Paternal Grandfather      Cardiovascular Brother      TRELL.A.D. Brother          MI at age 49     Hypertension Brother      Hypertension Sister      Cardiovascular Brother      TRELL.A.D. Brother         ASCVD age 55     Cancer - colorectal No family hx of      Prostate Cancer No family hx of        Objective  BP (!) 179/106   Ht 1.759 m (5' 9.25\")   Wt 93 kg (205 lb)   BMI 30.06 kg/m        General: healthy, alert and in no distress      HEENT: no scleral icterus or conjunctival erythema     Skin: no suspicious lesions or rash. No jaundice.     CV: regular rhythm by palpation, 2+ distal pulses, no pedal edema      Resp: normal respiratory effort without conversational dyspnea     Psych: normal mood and affect      Gait: nonantalgic, appropriate coordination and balance     Neuro: normal light touch sensory exam of the extremities. Motor strength as noted below     Left Shoulder exam    ROM:        Decreased active and passive ROM with flexion, abduction and external rotation.    Tender:        Mild over supraspinatus and infraspinatus    Non Tender:       remainder of shoulder       sternoclavicular joint       acromioclavicular joint    Strength:        abduction 2/5       internal rotation 5/5       external rotation 2/5       adduction 5/5    Impingement testing:        positive (+) Neer       neg (-) Sands       positive (+) empty can    Stability testing:       neg (-) anterior glide       neg (-) sulcus sign    Skin:       no visible deformities       well perfused       capillary refill brisk    Sensation:        normal sensation over shoulder and upper extremity     Cervical spine Exam  Inspection: Mildly increased cervical lordosis  Non-tender:  spinous processes  Range of Motion:  Full ROM of " cervical spine  Strength: Full strength of all neck muscles  Special tests:  Spurling's - negative - left, Spurling's - negative - right, neg adson's      Radiology  Recent Results (from the past 744 hour(s))   XR Shoulder Left G/E 3 Views    Narrative    XR SHOULDER LT G/E 3 VW   10/10/2019 2:50 PM     HISTORY:  Pain in joint of left shoulder    COMPARISON: 4/12/2017      Impression    IMPRESSION: No acute fracture or malalignment. Mild glenohumeral and  acromioclavicular joint degenerative changes. Minimal calcification  adjacent to the greater tuberosity suggests calcific tendinopathy.  Osteopenia. No osseous lesion.    HUSEYIN GASCA MD   XR Cervical Spine 2/3 Views    Narrative    CERVICAL SPINE TWO TO THREE VIEWS  10/10/2019 2:51 PM     COMPARISON: None.    HISTORY: Pain in joint of left shoulder.      Impression    IMPRESSION: The study is limited in that only C1-C6 are visualized on  the lateral view. Alignment of the visualized portions of the cervical  spine appear normal. There is no evidence for fracture of the  visualized portions of the cervical spine. There is no prevertebral  soft tissue swelling. There is facet arthropathy throughout the  visualized portions of the cervical spine.    MAHENDRA LEONARD MD     Results for orders placed or performed in visit on 10/31/19   MRI Cervical spine w/o contrast    Narrative    MR CERVICAL SPINE WITHOUT CONTRAST October 31, 2019 9:27 AM     HISTORY: Radiculopathy. Cervicalgia. Arm weakness.    TECHNIQUE: Multiplanar multisequence images were obtained through the  cervical spine without contrast.    COMPARISON: None.    FINDINGS: Sagittal images demonstrate normal posterior alignment.  There is no evidence for craniovertebral or cervical medullary  junction abnormality. The cervical cord is normal in morphology and  signal characteristics. Disc space narrowing is present at C4-C5. Bone  marrow signal intensity is normal.    C1-C2: Normal.    C2-C3:  Normal.    C3-C4: Minimal disc bulge and facet hypertrophy. No stenosis.    C4-C5: Broad-based posterior osteophyte formation disc bulging facet  hypertrophy is present causing some mild to moderate central canal  stenosis and moderate bilateral neural foraminal stenosis.    C5-C6: Broad-based disc bulge and mild facet hypertrophy is present  causing mild central canal stenosis and mild bilateral neural  foraminal stenosis.    C6-C7: Minimal facet hypertrophy and disc bulging. No stenosis.    C7-T1: Minimal disc bulging. No stenosis.    Paraspinal soft tissues: Unremarkable as visualized.      Impression    IMPRESSION: Mid to lower cervical degenerative disc and facet disease  most advanced at C4-C5 where there is mild-to-moderate central canal  stenosis and moderate bilateral neural foraminal stenosis. There is  also mild central and mild bilateral neural foraminal stenosis at  C5-C6.    JADYN CRABTREE MD         Assessment:  1. Arm weakness    2. Cervicalgia    3. Rotator cuff insufficiency of left shoulder    4. Suprascapular mononeuropathy, left        Plan:  Discussed the assessment with the patient.  Follow up: Reviewed cervical and shoulder MRI results   Leg does have some moderate C4-5 findings, I have a low suspicion that this is a cervical nerve root issue   Suprascapular nerve palsy makes more sense, especially given his essentially normal shoulder MRI and ongoing issues with firing of supraspinatus and infraspinatus   no injury to associate with sudden weakness, exam similar to RTC tear, MRI ordered to clarify  Suspect this is work-related given his overhead job is an    Ordered EMG today for further evaluation  MR images independently visualized and reviewed with patient today in clinic  PT options reviewed  Home handouts provided and supportive care reviewed  All questions were answered today  Contact us with additional questions or concerns  Signs and sx of concern reviewed      Tre Wilson,  DOSRINIVAS  Primary Care Sports Medicine  [facility]           Disclaimer: This note consists of symbols derived from keyboarding, dictation and/or voice recognition software. As a result, there may be errors in the script that have gone undetected. Please consider this when interpreting information found in this chart.

## 2019-11-08 NOTE — LETTER
2019         RE: Jcarlos Alvarez  [address] [facility] [address]        Dear Colleague,    Thank you for referring your patient, Jcarlos Alvarez, to the [facility]. Please see a copy of my visit note below.    Jcarlos Alvarez  :  1950  DOS: 19  MRN: [id number]    Sports Medicine Clinic Visit    PCP: Fuentes Sims    Jcarlos Alvarez is a 69 year old Right hand dominant male who is seen as a self referral presenting with acute on chronic left shoulder pain.    Injury: Gradual onset of flare of chronic left shoulder pain over the last 4 weeks, pain significantly worse over the last ~ 2 days.  Pain located over left posterior shoulder, periscapular region, radiating to left upper extremity.  Reports intermittent radiating, numbness and tingling to left hand.  Additional Features:  Positive: numbness, weakness and burning pain, decreased AROM.  Symptoms are better with Rest, ice.  Symptoms are worse with: lying on left shoulder, reaching, shoulder flexion/abduction.  Other evaluation and/or treatments so far consists of: Ice, Ibuprofen, Rest and physical therapy (4 sessions).  Recent imaging completed: No recent imaging completed.  Prior History of related problems: history of intermittent left shoulder pain over the past ~ 2+ years that usually improves with 3 - 4 visits of physical therapy.    Social History: works as an      Interim History - 2019  Since last visit on 10/10/19 patient has only mild improvement in left shoulder pain and weakness.  Here to further review results of MRI 10/31/19.  No interim injury.         Review of Systems  Musculoskeletal: as above  Remainder of review of systems is negative including constitutional, CV, pulmonary, GI, Skin and Neurologic except as noted in HPI or medical history.    Past Medical History:   Diagnosis Date     CAD (coronary artery disease)      Cervicalgia      Diverticula of  "colon 2009    Diverticulosis sigmoid colon.     Hypertension      Old bucket handle tear of medial meniscus     right     Other affections of shoulder region, not elsewhere classified      Past Surgical History:   Procedure Laterality Date     C EXCIS KNEE CARTILAGE,MEDIAL OR LAT      Right     TONSILLECTOMY       Family History   Problem Relation Age of Onset     TRELL.KATHRYN. Mother         stent age 75     Hypertension Mother      Asthma Paternal Grandfather      Diabetes Paternal Grandfather      Cardiovascular Brother      TRELL.A.ABRAHAM. Brother          MI at age 49     Hypertension Brother      Hypertension Sister      Cardiovascular Brother      TRELL.A.ABRAHAM. Brother         ASCVD age 55     Cancer - colorectal No family hx of      Prostate Cancer No family hx of        Objective  BP (!) 179/106   Ht 1.759 m (5' 9.25\")   Wt 93 kg (205 lb)   BMI 30.06 kg/m         General: healthy, alert and in no distress      HEENT: no scleral icterus or conjunctival erythema     Skin: no suspicious lesions or rash. No jaundice.     CV: regular rhythm by palpation, 2+ distal pulses, no pedal edema      Resp: normal respiratory effort without conversational dyspnea     Psych: normal mood and affect      Gait: nonantalgic, appropriate coordination and balance     Neuro: normal light touch sensory exam of the extremities. Motor strength as noted below     Left Shoulder exam    ROM:        Decreased active and passive ROM with flexion, abduction and external rotation.    Tender:        Mild over supraspinatus and infraspinatus    Non Tender:       remainder of shoulder       sternoclavicular joint       acromioclavicular joint    Strength:        abduction 2/5       internal rotation 5/5       external rotation 2/5       adduction 5/5    Impingement testing:        positive (+) Neer       neg (-) Sands       positive (+) empty can    Stability testing:       neg (-) anterior glide       neg (-) sulcus sign    Skin:       no visible " deformities       well perfused       capillary refill brisk    Sensation:        normal sensation over shoulder and upper extremity     Cervical spine Exam  Inspection: Mildly increased cervical lordosis  Non-tender:  spinous processes  Range of Motion:  Full ROM of cervical spine  Strength: Full strength of all neck muscles  Special tests:  Spurling's - negative - left, Spurling's - negative - right, neg adson's      Radiology  Recent Results (from the past 744 hour(s))   XR Shoulder Left G/E 3 Views    Narrative    XR SHOULDER LT G/E 3 VW   10/10/2019 2:50 PM     HISTORY:  Pain in joint of left shoulder    COMPARISON: 4/12/2017      Impression    IMPRESSION: No acute fracture or malalignment. Mild glenohumeral and  acromioclavicular joint degenerative changes. Minimal calcification  adjacent to the greater tuberosity suggests calcific tendinopathy.  Osteopenia. No osseous lesion.    HUSEYIN GASCA MD   XR Cervical Spine 2/3 Views    Narrative    CERVICAL SPINE TWO TO THREE VIEWS  10/10/2019 2:51 PM     COMPARISON: None.    HISTORY: Pain in joint of left shoulder.      Impression    IMPRESSION: The study is limited in that only C1-C6 are visualized on  the lateral view. Alignment of the visualized portions of the cervical  spine appear normal. There is no evidence for fracture of the  visualized portions of the cervical spine. There is no prevertebral  soft tissue swelling. There is facet arthropathy throughout the  visualized portions of the cervical spine.    MAHENDRA LEONARD MD     Results for orders placed or performed in visit on 10/31/19   MRI Cervical spine w/o contrast    Narrative    MR CERVICAL SPINE WITHOUT CONTRAST October 31, 2019 9:27 AM     HISTORY: Radiculopathy. Cervicalgia. Arm weakness.    TECHNIQUE: Multiplanar multisequence images were obtained through the  cervical spine without contrast.    COMPARISON: None.    FINDINGS: Sagittal images demonstrate normal posterior alignment.  There is no  evidence for craniovertebral or cervical medullary  junction abnormality. The cervical cord is normal in morphology and  signal characteristics. Disc space narrowing is present at C4-C5. Bone  marrow signal intensity is normal.    C1-C2: Normal.    C2-C3: Normal.    C3-C4: Minimal disc bulge and facet hypertrophy. No stenosis.    C4-C5: Broad-based posterior osteophyte formation disc bulging facet  hypertrophy is present causing some mild to moderate central canal  stenosis and moderate bilateral neural foraminal stenosis.    C5-C6: Broad-based disc bulge and mild facet hypertrophy is present  causing mild central canal stenosis and mild bilateral neural  foraminal stenosis.    C6-C7: Minimal facet hypertrophy and disc bulging. No stenosis.    C7-T1: Minimal disc bulging. No stenosis.    Paraspinal soft tissues: Unremarkable as visualized.      Impression    IMPRESSION: Mid to lower cervical degenerative disc and facet disease  most advanced at C4-C5 where there is mild-to-moderate central canal  stenosis and moderate bilateral neural foraminal stenosis. There is  also mild central and mild bilateral neural foraminal stenosis at  C5-C6.    JADYN CRABTREE MD         Assessment:  1. Arm weakness    2. Cervicalgia    3. Rotator cuff insufficiency of left shoulder    4. Suprascapular mononeuropathy, left        Plan:  Discussed the assessment with the patient.  Follow up: Reviewed cervical and shoulder MRI results   Leg does have some moderate C4-5 findings, I have a low suspicion that this is a cervical nerve root issue   Suprascapular nerve palsy makes more sense, especially given his essentially normal shoulder MRI and ongoing issues with firing of supraspinatus and infraspinatus   no injury to associate with sudden weakness, exam similar to RTC tear, MRI ordered to clarify  Suspect this is work-related given his overhead job is an    Ordered EMG today for further evaluation  MR images independently  visualized and reviewed with patient today in clinic  PT options reviewed  Home handouts provided and supportive care reviewed  All questions were answered today  Contact us with additional questions or concerns  Signs and sx of concern reviewed      Tre Wilson DO, SRINIVAS  Primary Care Sports Medicine  [facility]           Disclaimer: This note consists of symbols derived from keyboarding, dictation and/or voice recognition software. As a result, there may be errors in the script that have gone undetected. Please consider this when interpreting information found in this chart.    Again, thank you for allowing me to participate in the care of your patient.        Sincerely,        Tre Wilson DO

## 2019-11-09 ENCOUNTER — HEALTH MAINTENANCE LETTER (OUTPATIENT)
Age: 69
End: 2019-11-09

## 2019-11-22 ENCOUNTER — TRANSFERRED RECORDS (OUTPATIENT)
Dept: HEALTH INFORMATION MANAGEMENT | Facility: CLINIC | Age: 69
End: 2019-11-22

## 2020-01-20 ENCOUNTER — TRANSFERRED RECORDS (OUTPATIENT)
Dept: HEALTH INFORMATION MANAGEMENT | Facility: CLINIC | Age: 70
End: 2020-01-20

## 2020-01-23 PROBLEM — M54.12 CERVICAL RADICULOPATHY: Status: RESOLVED | Noted: 2019-01-03 | Resolved: 2020-01-23

## 2020-01-23 NOTE — PROGRESS NOTES
Patient did not return for further treatment and no additional progress was noted.  Please refer to the progress note and goal flowsheet completed on 10/22/19 for discharge information.

## 2020-01-30 ENCOUNTER — TRANSFERRED RECORDS (OUTPATIENT)
Dept: HEALTH INFORMATION MANAGEMENT | Facility: CLINIC | Age: 70
End: 2020-01-30

## 2020-02-23 ENCOUNTER — HEALTH MAINTENANCE LETTER (OUTPATIENT)
Age: 70
End: 2020-02-23

## 2020-06-09 ENCOUNTER — TRANSFERRED RECORDS (OUTPATIENT)
Dept: HEALTH INFORMATION MANAGEMENT | Facility: CLINIC | Age: 70
End: 2020-06-09

## 2020-06-29 ENCOUNTER — TRANSFERRED RECORDS (OUTPATIENT)
Dept: HEALTH INFORMATION MANAGEMENT | Facility: CLINIC | Age: 70
End: 2020-06-29

## 2020-07-17 ENCOUNTER — OFFICE VISIT (OUTPATIENT)
Dept: FAMILY MEDICINE | Facility: CLINIC | Age: 70
End: 2020-07-17
Payer: MEDICARE

## 2020-07-17 VITALS
OXYGEN SATURATION: 96 % | TEMPERATURE: 98 F | DIASTOLIC BLOOD PRESSURE: 78 MMHG | HEART RATE: 70 BPM | WEIGHT: 202.4 LBS | BODY MASS INDEX: 29.67 KG/M2 | SYSTOLIC BLOOD PRESSURE: 154 MMHG

## 2020-07-17 DIAGNOSIS — R81 GLUCOSURIA: ICD-10-CM

## 2020-07-17 DIAGNOSIS — R73.01 ELEVATED FASTING GLUCOSE: ICD-10-CM

## 2020-07-17 DIAGNOSIS — N30.00 ACUTE CYSTITIS WITHOUT HEMATURIA: Primary | ICD-10-CM

## 2020-07-17 LAB
ALBUMIN UR-MCNC: NEGATIVE MG/DL
ANION GAP SERPL CALCULATED.3IONS-SCNC: 3 MMOL/L (ref 3–14)
APPEARANCE UR: ABNORMAL
BACTERIA #/AREA URNS HPF: ABNORMAL /HPF
BILIRUB UR QL STRIP: NEGATIVE
BUN SERPL-MCNC: 19 MG/DL (ref 7–30)
CALCIUM SERPL-MCNC: 9.2 MG/DL (ref 8.5–10.1)
CHLORIDE SERPL-SCNC: 99 MMOL/L (ref 94–109)
CO2 SERPL-SCNC: 33 MMOL/L (ref 20–32)
COLOR UR AUTO: YELLOW
CREAT SERPL-MCNC: 0.74 MG/DL (ref 0.66–1.25)
GFR SERPL CREATININE-BSD FRML MDRD: >90 ML/MIN/{1.73_M2}
GLUCOSE SERPL-MCNC: 225 MG/DL (ref 70–99)
GLUCOSE UR STRIP-MCNC: >=1000 MG/DL
HBA1C MFR BLD: 8.8 % (ref 0–5.6)
HGB UR QL STRIP: ABNORMAL
KETONES UR STRIP-MCNC: NEGATIVE MG/DL
LEUKOCYTE ESTERASE UR QL STRIP: ABNORMAL
NITRATE UR QL: NEGATIVE
NON-SQ EPI CELLS #/AREA URNS LPF: ABNORMAL /LPF
PH UR STRIP: 6 PH (ref 5–7)
POTASSIUM SERPL-SCNC: 4.9 MMOL/L (ref 3.4–5.3)
RBC #/AREA URNS AUTO: ABNORMAL /HPF
SODIUM SERPL-SCNC: 135 MMOL/L (ref 133–144)
SOURCE: ABNORMAL
SP GR UR STRIP: 1.02 (ref 1–1.03)
UROBILINOGEN UR STRIP-ACNC: 0.2 EU/DL (ref 0.2–1)
WBC #/AREA URNS AUTO: ABNORMAL /HPF

## 2020-07-17 PROCEDURE — 99213 OFFICE O/P EST LOW 20 MIN: CPT | Performed by: NURSE PRACTITIONER

## 2020-07-17 PROCEDURE — 87086 URINE CULTURE/COLONY COUNT: CPT | Performed by: NURSE PRACTITIONER

## 2020-07-17 PROCEDURE — 36415 COLL VENOUS BLD VENIPUNCTURE: CPT | Performed by: NURSE PRACTITIONER

## 2020-07-17 PROCEDURE — 81001 URINALYSIS AUTO W/SCOPE: CPT | Performed by: NURSE PRACTITIONER

## 2020-07-17 PROCEDURE — 83036 HEMOGLOBIN GLYCOSYLATED A1C: CPT | Performed by: NURSE PRACTITIONER

## 2020-07-17 PROCEDURE — 80048 BASIC METABOLIC PNL TOTAL CA: CPT | Performed by: NURSE PRACTITIONER

## 2020-07-17 RX ORDER — CIPROFLOXACIN 500 MG/1
500 TABLET, FILM COATED ORAL 2 TIMES DAILY
Qty: 14 TABLET | Refills: 0 | Status: SHIPPED | OUTPATIENT
Start: 2020-07-17 | End: 2020-07-24

## 2020-07-17 ASSESSMENT — ENCOUNTER SYMPTOMS
FREQUENCY: 1
ABDOMINAL PAIN: 0
DYSURIA: 1
DIFFICULTY URINATING: 0
FEVER: 1
NAUSEA: 0
CHILLS: 0
COUGH: 0
VOMITING: 0
HEMATURIA: 0
SHORTNESS OF BREATH: 0
FLANK PAIN: 1

## 2020-07-17 NOTE — PROGRESS NOTES
Subjective   Jcarlos Alvarez is a 69 year old male who presents to clinic today for the following health issues:  HPI   Genitourinary symptoms    Duration: 2 weeks ago    Description:  Urinating more often than usual, urine is not clear    Intensity:  mild    Accompanying signs and symptoms (fever/discharge/nausea/vomiting/back or abdominal pain):  Low grade fever 99F, sweats    History (frequent UTI's/kidney stones/prostate problems): None  Sexually active: YES    Precipitating or alleviating factors: None    Therapies tried and outcome: none       Reports mild low back pain, low grade temp (99), and night sweats after taking Aspirin x 2 weeks.  + Dysuria, cloudy. Denies abdominal pain or hematuria.        Patient Active Problem List   Diagnosis     Obesity     CAD (coronary artery disease)     Diverticulosis of colon     Advanced directives, counseling/discussion     Seborrheic keratosis     Lentigines     Hypertension goal BP (blood pressure) < 140/90     Dyslipidemia     Low back pain, unspecified back pain laterality, unspecified chronicity, with sciatica presence unspecified     Past Surgical History:   Procedure Laterality Date     C EXCIS KNEE CARTILAGE,MEDIAL OR LAT      Right     TONSILLECTOMY         Social History     Tobacco Use     Smoking status: Never Smoker     Smokeless tobacco: Never Used   Substance Use Topics     Alcohol use: Yes     Comment: Occassionally     Family History   Problem Relation Age of Onset     C.A.D. Mother         stent age 75     Hypertension Mother      Asthma Paternal Grandfather      Diabetes Paternal Grandfather      Cardiovascular Brother      C.A.D. Brother          MI at age 49     Hypertension Brother      Hypertension Sister      Cardiovascular Brother      C.A.D. Brother         ASCVD age 55     Cancer - colorectal No family hx of      Prostate Cancer No family hx of          Current Outpatient Medications   Medication Sig Dispense Refill     amLODIPine (NORVASC) 5  MG tablet TAKE 1 TABLET(5 MG) BY MOUTH DAILY 30 tablet 0     aspirin 81 MG tablet Take 1 tablet by mouth daily.       ciprofloxacin (CIPRO) 500 MG tablet Take 1 tablet (500 mg) by mouth 2 times daily for 7 days 14 tablet 0     losartan-hydrochlorothiazide (HYZAAR) 50-12.5 MG tablet TAKE 1 TABLET BY MOUTH DAILY 30 tablet 0     MULTI-VITAMIN OR TABS 1 TABLET DAILY       VITAMIN D PO        atorvastatin (LIPITOR) 80 MG tablet TAKE ONE TABLET BY MOUTH DAILY (Patient not taking: Reported on 10/10/2019) 30 tablet 0     oxyCODONE-acetaminophen (PERCOCET) 5-325 MG tablet Take 1 tablet by mouth every 6 hours as needed for severe pain (Patient not taking: Reported on 7/17/2020) 20 tablet 0     Allergies   Allergen Reactions     Nkda [No Known Drug Allergies]      Lisinopril      cough       Reviewed and updated as needed this visit by Provider         Review of Systems   Constitutional: Positive for fever. Negative for chills.   Respiratory: Negative for cough and shortness of breath.    Cardiovascular: Negative for chest pain.   Gastrointestinal: Negative for abdominal pain, nausea and vomiting.   Genitourinary: Positive for dysuria, flank pain and frequency. Negative for difficulty urinating, hematuria, penile pain and urgency.            Objective    BP (!) 154/78   Pulse 70   Temp 98  F (36.7  C) (Tympanic)   Wt 91.8 kg (202 lb 6.4 oz)   SpO2 96%   BMI 29.67 kg/m    Body mass index is 29.67 kg/m .  Physical Exam  Vitals signs reviewed.   Constitutional:       Appearance: He is well-developed.   HENT:      Head: Normocephalic.   Cardiovascular:      Rate and Rhythm: Normal rate and regular rhythm.   Pulmonary:      Effort: Pulmonary effort is normal.      Breath sounds: Normal breath sounds.   Abdominal:      Palpations: Abdomen is soft.   Skin:     General: Skin is warm and dry.   Neurological:      General: No focal deficit present.      Mental Status: He is alert.   Psychiatric:         Mood and Affect: Mood  "normal.         Behavior: Behavior normal.         Thought Content: Thought content normal.          Diagnostic Test Results:  Labs reviewed in Epic  Urinalysis - Moderate Blood, trace leukocytes, >1000 Glucose          Assessment & Plan     1. Acute cystitis without hematuria  - push fluids.  Will start Cipro while awaiting culture results  - UA reflex to Microscopic and Culture  - Urine Culture Aerobic Bacterial  - Urine Microscopic  - ciprofloxacin (CIPRO) 500 MG tablet; Take 1 tablet (500 mg) by mouth 2 times daily for 7 days  Dispense: 14 tablet; Refill: 0    2. Glucosuria  - Basic metabolic panel  (Ca, Cl, CO2, Creat, Gluc, K, Na, BUN)  - Hemoglobin A1c    3. Elevated fasting glucose  - Hemoglobin A1c     BMI:   Estimated body mass index is 29.67 kg/m  as calculated from the following:    Height as of 11/8/19: 1.759 m (5' 9.25\").    Weight as of this encounter: 91.8 kg (202 lb 6.4 oz).   Weight management plan: Discussed healthy diet and exercise guidelines        Return in about 3 days (around 7/20/2020) for worsening symptoms or failure to improve.    SERENE Yuan [facility]    "

## 2020-07-18 LAB
BACTERIA SPEC CULT: NO GROWTH
Lab: NORMAL
SPECIMEN SOURCE: NORMAL

## 2020-07-20 ENCOUNTER — MYC MEDICAL ADVICE (OUTPATIENT)
Dept: FAMILY MEDICINE | Facility: CLINIC | Age: 70
End: 2020-07-20

## 2020-11-12 ENCOUNTER — TRANSFERRED RECORDS (OUTPATIENT)
Dept: HEALTH INFORMATION MANAGEMENT | Facility: CLINIC | Age: 70
End: 2020-11-12

## 2020-11-30 ENCOUNTER — TRANSFERRED RECORDS (OUTPATIENT)
Dept: HEALTH INFORMATION MANAGEMENT | Facility: CLINIC | Age: 70
End: 2020-11-30

## 2021-01-01 ENCOUNTER — HEALTH MAINTENANCE LETTER (OUTPATIENT)
Age: 71
End: 2021-01-01

## 2021-01-01 NOTE — PROGRESS NOTES
Jcarlos Alvarez was evaluated in a [facility] on March 27, 2017 at which time his blood pressure was:    BP Readings from Last 3 Encounters:   03/27/17 148/78   02/22/17 (!) 154/92   02/13/17 (!) 160/100       Reviewed lifestyle modifications for blood pressure control and reduction: including making healthy food choices, managing weight, getting regular exercise, smoking cessation, reducing alcohol consumption, monitoring blood pressure regularly.     Jcarlos Alvarez is not experiencing symptoms.    Follow-Up: BP is not at goal of < 140/90mmHg (patient 18+ years of age with or without diabetes), Recommended follow-up in 1 month at the pharmacy. Routing to PCP as an FYI.    Completed by:  Tahir Lovelace RPh.  [facility]  [phone]      
Per 2/22/17 OV:  Stop by our pharmacy in 3 weeks without an appointment to check the blood pressure. If normal, let me see you back in 6 months for a physical. If high, the pharmacy will let me know.    Please see below blood pressure reading.     Thank you, Giovanna Brown, ALECN RN   
2021 11:25

## 2021-04-11 ENCOUNTER — HEALTH MAINTENANCE LETTER (OUTPATIENT)
Age: 71
End: 2021-04-11

## 2021-05-19 ENCOUNTER — TRANSFERRED RECORDS (OUTPATIENT)
Dept: HEALTH INFORMATION MANAGEMENT | Facility: CLINIC | Age: 71
End: 2021-05-19

## 2021-05-28 ENCOUNTER — TELEPHONE (OUTPATIENT)
Dept: FAMILY MEDICINE | Facility: CLINIC | Age: 71
End: 2021-05-28

## 2021-05-28 NOTE — TELEPHONE ENCOUNTER
Patient Quality Outreach Summary      Summary:    Patient is due/failing the following:   Colonoscopy    Type of outreach:    Sent iSites[employer] message.    Questions for provider review:    None                                                                                                                    Julia Irving CMA       Chart routed to closed.

## 2021-06-02 ENCOUNTER — TRANSFERRED RECORDS (OUTPATIENT)
Dept: HEALTH INFORMATION MANAGEMENT | Facility: CLINIC | Age: 71
End: 2021-06-02

## 2022-01-01 ENCOUNTER — TELEPHONE (OUTPATIENT)
Dept: ORTHOPEDICS | Facility: CLINIC | Age: 72
End: 2022-01-01

## 2022-01-01 ENCOUNTER — TRANSFERRED RECORDS (OUTPATIENT)
Dept: HEALTH INFORMATION MANAGEMENT | Facility: CLINIC | Age: 72
End: 2022-01-01
Payer: COMMERCIAL

## 2022-01-01 ENCOUNTER — HEALTH MAINTENANCE LETTER (OUTPATIENT)
Age: 72
End: 2022-01-01

## 2022-01-01 ENCOUNTER — OFFICE VISIT (OUTPATIENT)
Dept: FAMILY MEDICINE | Facility: CLINIC | Age: 72
End: 2022-01-01
Payer: MEDICARE

## 2022-01-01 VITALS
SYSTOLIC BLOOD PRESSURE: 136 MMHG | WEIGHT: 213 LBS | DIASTOLIC BLOOD PRESSURE: 70 MMHG | TEMPERATURE: 97.6 F | HEIGHT: 70 IN | OXYGEN SATURATION: 96 % | HEART RATE: 78 BPM | BODY MASS INDEX: 30.49 KG/M2

## 2022-01-01 DIAGNOSIS — Z53.9 ERRONEOUS ENCOUNTER--DISREGARD: Primary | ICD-10-CM

## 2022-01-01 ASSESSMENT — ENCOUNTER SYMPTOMS
COUGH: 0
MYALGIAS: 0
FEVER: 0
ARTHRALGIAS: 0
HEADACHES: 0
PALPITATIONS: 0
HEARTBURN: 0
WEAKNESS: 0
NAUSEA: 0
SHORTNESS OF BREATH: 0
SORE THROAT: 0
HEMATOCHEZIA: 0
NERVOUS/ANXIOUS: 0
CHILLS: 0
DYSURIA: 0
DIZZINESS: 0
ABDOMINAL PAIN: 0
EYE PAIN: 0
PARESTHESIAS: 0
HEMATURIA: 0
JOINT SWELLING: 0
FREQUENCY: 0
DIARRHEA: 0
CONSTIPATION: 0

## 2022-01-01 ASSESSMENT — ACTIVITIES OF DAILY LIVING (ADL): CURRENT_FUNCTION: NO ASSISTANCE NEEDED

## 2022-01-01 ASSESSMENT — PAIN SCALES - GENERAL: PAINLEVEL: NO PAIN (0)

## 2022-06-03 NOTE — NURSING NOTE
"Chief Complaint   Patient presents with     others       Initial /70   Pulse 78   Temp 97.6  F (36.4  C) (Tympanic)   Ht 1.778 m (5' 10\")   Wt 96.6 kg (213 lb)   SpO2 96%   BMI 30.56 kg/m   Estimated body mass index is 30.56 kg/m  as calculated from the following:    Height as of this encounter: 1.778 m (5' 10\").    Weight as of this encounter: 96.6 kg (213 lb).  Medication Reconciliation: complete    VIJAY Pope MA    "

## 2022-06-03 NOTE — PATIENT INSTRUCTIONS
Patient Education   Personalized Prevention Plan  You are due for the preventive services outlined below.  Your care team is available to assist you in scheduling these services.  If you have already completed any of these items, please share that information with your care team to update in your medical record.  Health Maintenance Due   Topic Date Due     ANNUAL REVIEW OF HM ORDERS  Never done     AORTIC ANEURYSM SCREENING (SYSTEM ASSIGNED)  Never done     Annual Wellness Visit  01/08/2019     Cholesterol Lab  01/08/2019     FALL RISK ASSESSMENT  01/08/2019     Colorectal Cancer Screening  12/31/2019     PHQ-2 (once per calendar year)  01/01/2022     COVID-19 Vaccine (4 - Booster for Moderna series) 04/09/2022

## 2022-06-03 NOTE — PROGRESS NOTES
We cancelled today's appointment. The following issues should be noted:    1. I last saw him Jan 2018. He reports that his cardiologist has been treating his blood pressure.    2. He asked me to fill out a form for his  license. Apparently I have filled it out before. He said all the boxes had to be checked including anal exam and vision test with color etc along with other detailed exam. I explained I cannot do that and he was disappointed but said he understood.    3. His glucose and A1c were in the diabetes range a couple of years ago with no follow-up. He says he did not know about it and said he feels fine. I explained he needs to address this but he declined today.    Per his wishes, we ended our visit without any meaningful healthcare.    Fuentes Sims M.D.

## 2022-07-15 NOTE — TELEPHONE ENCOUNTER
Patient requesting bilateral L5-S1 transforaminal epidural steroid injection to be completed at [facility] radiology.    Last completed on 1/31/22 left, 2/10/2022 on right.    Please advise on repeat lumbar PRAVIN.    Dean Puentes ATC

## 2022-07-15 NOTE — TELEPHONE ENCOUNTER
[facility] Call Center    Phone Message    May a detailed message be left on voicemail: yes     Reason for Call: Other: Requesting order for PRAVIN spinal injection at [facility]. PLease fax order     Action Taken: Message routed to:  [facility] ([facility] ortho    Travel Screening: Not Applicable

## 2022-07-15 NOTE — TELEPHONE ENCOUNTER
Ok for repeat PRAVIN at [facility], if pain is the same as usual.  Thx.      Tre Wilson DO, CAQ  Sports Medicine Physician  [facility]

## 2022-07-15 NOTE — TELEPHONE ENCOUNTER
Updated orders placed on [facility] web portal.      Spoke to patient discussed updated orders placed.  He should follow up with Dr Wilson 2 - 3 weeks after procedure, if not getting relief as expected.    Dean Puentes, ATC